# Patient Record
Sex: MALE | Race: WHITE | NOT HISPANIC OR LATINO | Employment: OTHER | URBAN - METROPOLITAN AREA
[De-identification: names, ages, dates, MRNs, and addresses within clinical notes are randomized per-mention and may not be internally consistent; named-entity substitution may affect disease eponyms.]

---

## 2018-02-20 DIAGNOSIS — D49.2 THORACIC SPINE TUMOR: Primary | ICD-10-CM

## 2018-03-12 ENCOUNTER — HOSPITAL ENCOUNTER (OUTPATIENT)
Dept: MRI IMAGING | Facility: CLINIC | Age: 43
Discharge: HOME | End: 2018-03-12
Attending: NEUROLOGICAL SURGERY | Admitting: NEUROLOGICAL SURGERY
Payer: COMMERCIAL

## 2018-03-12 ENCOUNTER — OFFICE VISIT (OUTPATIENT)
Dept: NEUROSURGERY | Facility: CLINIC | Age: 43
End: 2018-03-12
Payer: COMMERCIAL

## 2018-03-12 VITALS
WEIGHT: 237.8 LBS | BODY MASS INDEX: 35.22 KG/M2 | HEART RATE: 94 BPM | DIASTOLIC BLOOD PRESSURE: 82 MMHG | HEIGHT: 69 IN | SYSTOLIC BLOOD PRESSURE: 117 MMHG

## 2018-03-12 DIAGNOSIS — D49.2 THORACIC SPINE TUMOR: Primary | ICD-10-CM

## 2018-03-12 DIAGNOSIS — D49.2 THORACIC SPINE TUMOR: ICD-10-CM

## 2018-03-12 PROCEDURE — 72157 MRI CHEST SPINE W/O & W/DYE: CPT

## 2018-03-12 PROCEDURE — 25000128 H RX IP 250 OP 636: Performed by: NEUROLOGICAL SURGERY

## 2018-03-12 PROCEDURE — A9585 GADOBUTROL INJECTION: HCPCS | Performed by: NEUROLOGICAL SURGERY

## 2018-03-12 RX ORDER — GADOBUTROL 604.72 MG/ML
10 INJECTION INTRAVENOUS ONCE
Status: COMPLETED | OUTPATIENT
Start: 2018-03-12 | End: 2018-03-12

## 2018-03-12 RX ADMIN — GADOBUTROL 10 ML: 604.72 INJECTION INTRAVENOUS at 11:07

## 2018-03-12 ASSESSMENT — PAIN SCALES - GENERAL: PAINLEVEL: NO PAIN (0)

## 2018-03-12 NOTE — LETTER
3/12/2018       RE: Darinel Sanchez  [id number] [facility] [address]     Dear Colleague,    Thank you for referring your patient, Darinel Sanchez, to the [facility] NEUROSURGERY at [facility]. Please see a copy of my visit note below.    Service Date: 2018      Sabi Perales MD      RE:  Darinel Sanchez      Dear Dr. Perales:      I had the pleasure to see Darinel today in my Neurosurgical Clinic for followup evaluation of a spinal tumor.      Briefly, Darinel is a 42-year-old gentleman who had presented to me in  with a spinal cord tumor.  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  He has done well over the last couple of years and returns today for a repeat MRI.        We have reviewed his MRI from today and there is no evidence of recurrence of the tumor.  I have recommended that he get a repeat MRI in the next 2 years.      Overall, I spent approximately 15 minutes with Darinel, with the majority of this time spent in consultation and developing a treatment plan.      Thank you for your trust and the opportunity to participate in Darinel's care.  If you have any further questions or concerns, please feel free to contact me on my cell phone at [phone].         MANOHAR CHE MD             D: 2018   T: 2018   MT: NEELIMA      Name:     DARINEL SANCHEZ   MRN:      [id number]        Account:      [id number]   :      1975           Service Date: 2018      Document: [id number]

## 2018-03-13 NOTE — PROGRESS NOTES
Service Date: 2018      Sabi Perales MD      RE:  Darinel Sanchez      Dear Dr. Perales:      I had the pleasure to see Darinel today in my Neurosurgical Clinic for followup evaluation of a spinal tumor.      Briefly, Darinel is a 42-year-old gentleman who had presented to me in  with a spinal cord tumor.  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  He has done well over the last couple of years and returns today for a repeat MRI.        We have reviewed his MRI from today and there is no evidence of recurrence of the tumor.  I have recommended that he get a repeat MRI in the next 2 years.      Overall, I spent approximately 15 minutes with Darinel, with the majority of this time spent in consultation and developing a treatment plan.      Thank you for your trust and the opportunity to participate in Darinel's care.  If you have any further questions or concerns, please feel free to contact me on my cell phone at [phone].         MANOHAR CHE MD             D: 2018   T: 2018   MT: NEELIMA      Name:     DARINEL SANCHEZ   MRN:      [id number]        Account:      [id number]   :      1975           Service Date: 2018      Document: [id number]

## 2019-02-12 ENCOUNTER — OFFICE VISIT (OUTPATIENT)
Dept: FAMILY MEDICINE | Facility: CLINIC | Age: 44
End: 2019-02-12
Payer: COMMERCIAL

## 2019-02-12 VITALS
DIASTOLIC BLOOD PRESSURE: 81 MMHG | SYSTOLIC BLOOD PRESSURE: 134 MMHG | RESPIRATION RATE: 18 BRPM | BODY MASS INDEX: 35.7 KG/M2 | HEIGHT: 69 IN | OXYGEN SATURATION: 98 % | HEART RATE: 84 BPM | WEIGHT: 241 LBS | TEMPERATURE: 98.3 F

## 2019-02-12 DIAGNOSIS — R42 VERTIGO: Primary | ICD-10-CM

## 2019-02-12 PROCEDURE — 99213 OFFICE O/P EST LOW 20 MIN: CPT | Performed by: PHYSICIAN ASSISTANT

## 2019-02-12 ASSESSMENT — MIFFLIN-ST. JEOR: SCORE: 1973.55

## 2019-02-12 NOTE — PROGRESS NOTES
SUBJECTIVE:                                                    Darinel Alicia is a 43 year old male seen today who  has a past medical history of ADHD (attention deficit hyperactivity disorder) and Spinal cord mass (H).   who presents to clinic today for the following health issues:       ED/UC Followup:    Facility:  [facility]  Date of visit: 2/1/19  Reason for visit: Vertigo, chest pain  Current Status: no improvement per pt - has been 2 weeks. Left ear feels plugged and ringing. Jaw feels sore. Was told to see ENT but couldn't get in until the end of the month      Care Everywhere Reviewed  Chief Complaint:   Dizziness and chest pain     History of Present Illness:   The history is provided by the patient.     Darinel Alicia is a 43 y.o. male who presents to the emergency department for evaluation of dizziness and chest pain. Yesterday the patient woke up and was dizzy, which continued all day, coming in spells when he would stand/sit up with associated blurred vision. Around 2200 he started to have left arm numbness and he woke up at 0230 with mid chest pain, so he presents to the emergency department. On evaluation here, he currently rates his chest pain a 2/10 in severity. He is also complaining of indigestion, but denies any fevers, chills, weakness, nausea, vomiting, urinary symptoms, bowel symptoms, falls, injuries, hallucinations, jaw pain, rash or tinnitus. The patient does note that the back of his head was sore last night, but he does not describe this as a true headache. He does state that he has had cardiac issues in the past with irregular heart beats and feeling like his heart is going to stop and his symptoms currently feel similar to when he was about to feel those cardiac symptoms. The patient has had no recent long travel or surgeries.     Impression and Plan:  Darinel Alicia is a 43 y.o. male who here for evaluation of a constellation of symptoms as above including  left-sided chest pain that is nonexertional and somewhat reproducible and sharp and intermittent in nature, left-sided arm paresthesia without objective sensory deficit, vertiginous symptoms. Workup included evaluation for central nervous disaster vs peripheral vertigo vs ACS vs dissection vs pneumothorax vs pneumonia versus cellulitis versus shingles versus musculoskeletal chest pain. His neurologic exam is reassuring. The HI NTS exam was indeterminate. His vertigo is consistent with labyrinthitis versus BPPV. It is clearly positional. He had positive Sam-Hallpike to the right. The Epley maneuver did not improve his symptoms. He does not have significant nausea or vomiting. He denied any need for medications for nausea. He was given meclizine and 1 L of IV fluid. He continued to have symptoms however they are very brief and tolerable. He was ambulated in the emergency department and has no ataxia. I do not believe this is consistent with posterior stroke. Troponin and delta troponin were reassuring as well as EKG. D-dimer was normal and he is low risk for PE, therefore I believe this is effectively ruled out. The chest pain is very atypical and certainly not exertional. He has low risk for ACS. He was initially worked up for this and given aspirin and nitro. His pain comes and goes and is sharp in nature. Chest x-ray was unremarkable. I do not believe this is consistent with dissection. There is no overlying skin changes suggestive of cellulitis or shingles. I offered outpatient stress test however he declined and advised that he would follow-up with his primary care doctor. We discussed reasons to return he was discharged home with his wife. I advised him not to drive while having vertigo.    Con't dizziness: better with head rotation but increase with flex and ext   Feels like left ear is plugged. Did feel left ear pop yesterday and now ringing since.   No previous problems like this. No recent illnesses. No  "colds, fevers, or chest pain or short of breath.   \"I feel fine otherwise\"     Problem list and histories reviewed & adjusted, as indicated.  Additional history: as documented    Patient Active Problem List   Diagnosis     Family history of ischemic heart disease     Thoracic back pain     Adult ADHD     Former smoker     CARDIOVASCULAR SCREENING; LDL GOAL LESS THAN 130     Spinal cord mass (H)     Thoracic spine tumor     Past Surgical History:   Procedure Laterality Date     CHOLECYSTECTOMY       EYE SURGERY      Multiple     GALLBLADDER SURGERY      Removal     LAMINECTOMY, EXCISE TUMOR THORACIC, COMBINED  5/16/2014    Procedure: COMBINED LAMINECTOMY, EXCISE TUMOR THORACIC;  Surgeon: Ananda Dinero MD;  Location: [facility] OR     Testicular surgery  As a child       Social History     Tobacco Use     Smoking status: Light Tobacco Smoker     Packs/day: 0.25     Types: Cigarettes     Smokeless tobacco: Former User     Types: Chew     Tobacco comment: 3 cigarettes and 1 chew   Substance Use Topics     Alcohol use: Yes     Comment: Occasional - 1 drink or less per week     Family History   Problem Relation Age of Onset     Allergies Mother      Arthritis Mother      Depression Mother      Gastrointestinal Disease Mother      Obesity Mother      C.A.D. Mother      Arthritis Father      Endocrine Disease Father      Heart Disease Father      C.A.D. Father      Cancer Maternal Grandmother      Endocrine Disease Maternal Grandmother      C.A.D. Maternal Grandmother      Cancer Maternal Grandfather      Endocrine Disease Maternal Grandfather      C.A.D. Maternal Grandfather      Cerebrovascular Disease Paternal Grandmother      Endocrine Disease Paternal Grandmother      Heart Disease Paternal Grandmother      Obesity Paternal Grandmother      C.A.D. Paternal Grandmother      Cerebrovascular Disease Paternal Grandfather      Cancer Paternal Grandfather      Cardiovascular Paternal Grandfather      Endocrine Disease " "Paternal Grandfather      Heart Disease Paternal Grandfather      Obesity Paternal Grandfather      C.A.DEBORAH. Paternal Grandfather      CHELSY.A.DEBORAH. Brother      JUN. Sister      CHELSY.YANN.DEBORAH. Brother          Current Outpatient Medications   Medication Sig Dispense Refill     amphetamine-dextroamphetamine (ADDERALL) 10 MG tablet Take 10 mg by mouth daily.       Sertraline HCl (ZOLOFT PO) Take 25 mg by mouth daily Per patient report         Acetaminophen (TYLENOL PO) Take by mouth every 4 hours as needed for mild pain or fever       Ibuprofen (ADVIL PO) Take 400 mg by mouth every 6 hours as needed.         Allergies   Allergen Reactions     Penicillin G      From childhood, unknown reaction     BP Readings from Last 3 Encounters:   02/12/19 134/81   03/12/18 117/82   05/12/15 130/79    Wt Readings from Last 3 Encounters:   02/12/19 109.3 kg (241 lb)   03/12/18 107.9 kg (237 lb 12.8 oz)   05/12/15 98 kg (216 lb 1.6 oz)            Labs reviewed in EPIC    ROS:  Constitutional, HEENT, cardiovascular, pulmonary, gi and gu systems are negative, except as otherwise noted.    OBJECTIVE:     /81   Pulse 84   Temp 98.3  F (36.8  C) (Oral)   Resp 18   Ht 1.753 m (5' 9\")   Wt 109.3 kg (241 lb)   SpO2 98%   BMI 35.59 kg/m    Body mass index is 35.59 kg/m .  GENERAL: healthy, alert and no distress  EYES: Eyes grossly normal to inspection, PERRL and conjunctivae and sclerae normal  HENT: ear canals and TM's normal, nose and mouth without ulcers or lesions  NECK: no adenopathy, no asymmetry, masses, or scars and thyroid normal to palpation  RESP: lungs clear to auscultation - no rales, rhonchi or wheezes  CV: regular rate and rhythm, normal S1 S2, no S3 or S4, no murmur, click or rub, no peripheral edema and peripheral pulses strong  MS: no gross musculoskeletal defects noted, no edema  SKIN: no suspicious lesions or rashes  NEURO: Normal strength and tone, sensory exam grossly normal, mentation intact and cranial nerves 2-12 " intact  PSYCH: mentation appears normal, affect normal/bright    Diagnostic Test Results:  none     ASSESSMENT/PLAN:         ICD-10-CM    1. Vertigo R42 OTOLARYNGOLOGY REFERRAL     [facility] PT, HAND, AND CHIROPRACTIC REFERRAL   patient requesting referral to ENT. He is very concerned. History of spinal tumor   and wants to make sure his symptoms aren't related.   Consider PHYSICAL THERAPY for further assessment. States his brother in law is a PHYSICAL THERAPIST and he may see him.   Discussed tx of vertigo. Slow head movements. warning signs discussed. Ok for meclizine.   Discussed with Dr. Didi Mojica PA-C  [facility]

## 2019-02-27 ENCOUNTER — OFFICE VISIT (OUTPATIENT)
Dept: AUDIOLOGY | Facility: CLINIC | Age: 44
End: 2019-02-27
Payer: COMMERCIAL

## 2019-02-27 ENCOUNTER — OFFICE VISIT (OUTPATIENT)
Dept: OTOLARYNGOLOGY | Facility: CLINIC | Age: 44
End: 2019-02-27
Payer: COMMERCIAL

## 2019-02-27 VITALS
BODY MASS INDEX: 35.7 KG/M2 | TEMPERATURE: 98 F | SYSTOLIC BLOOD PRESSURE: 129 MMHG | HEART RATE: 78 BPM | WEIGHT: 241 LBS | HEIGHT: 69 IN | DIASTOLIC BLOOD PRESSURE: 88 MMHG | OXYGEN SATURATION: 98 % | RESPIRATION RATE: 18 BRPM

## 2019-02-27 DIAGNOSIS — R42 VERTIGO: Primary | ICD-10-CM

## 2019-02-27 DIAGNOSIS — H81.11 BENIGN PAROXYSMAL POSITIONAL VERTIGO, RIGHT: ICD-10-CM

## 2019-02-27 DIAGNOSIS — R42 DIZZINESS AND GIDDINESS: Primary | ICD-10-CM

## 2019-02-27 DIAGNOSIS — H90.5 HIGH FREQUENCY SENSORINEURAL HEARING LOSS OF LEFT EAR: ICD-10-CM

## 2019-02-27 PROCEDURE — 99207 ZZC NO CHARGE LOS: CPT | Performed by: AUDIOLOGIST

## 2019-02-27 PROCEDURE — 99203 OFFICE O/P NEW LOW 30 MIN: CPT | Performed by: OTOLARYNGOLOGY

## 2019-02-27 PROCEDURE — 92550 TYMPANOMETRY & REFLEX THRESH: CPT | Performed by: AUDIOLOGIST

## 2019-02-27 PROCEDURE — 92557 COMPREHENSIVE HEARING TEST: CPT | Performed by: AUDIOLOGIST

## 2019-02-27 ASSESSMENT — MIFFLIN-ST. JEOR: SCORE: 1973.55

## 2019-02-27 NOTE — PROGRESS NOTES
AUDIOLOGY REPORT    SUBJECTIVE:  Darinel Alicia is a 43 year old male who was seen in the Audiology Clinic at [facility] for an audiologic evaluation, referred by Dr. Campos.  No previous audiograms are available at today's appointment.  The patient reports a 6 week history of vertigo and feeling lightheaded. He feels he is hearing well and notes no noted changes in his heraing. The patient denies bilateral otalgia, bilateral drainage and history of noise exposure.     OBJECTIVE:  Otoscopic exam indicates ears are clear of cerumen bilaterally     Pure Tone Thresholds assessed using conventional audiometry with good  reliability from 250-8000 Hz bilaterally using circumaural headphones     RIGHT:  normal hearing thresholds    LEFT:    normal and mild sensorineural hearing loss    Tympanogram:    RIGHT: normal eardrum mobility ,1000 Hz,2000 ipsi/contra reflexes present ,contra absent                  at 500 Hz    LEFT:   normal eardrum mobility,1000 -2000 Hz ipsi/contra reflexes present , contra                absent at 500 Hz    Speech Reception Threshold:    RIGHT: 5 dB HL    LEFT:   5 dB HL  Word Recognition Score:     RIGHT: 92% at 50 dB HL using W22 recorded word list.    LEFT:   96% at 50 dB HL using W22 recorded word list.      ASSESSMENT:   Normal hearing in the right ear with normal to mild v-shaped sensorineural hearing loss in the left ear.     Today s results were discussed with the patient in detail.     PLAN: It is recommended that the patient be seen by Dr. Campos for medical evaluation of their ears and hearing evaluation. Patient was counseled regarding hearing loss and impact on communication.  Reviewed need for ear protection when exposed to occupational and recreational noise.  Please call this clinic with questions regarding these results or recommendations.        Evelin Otoole M.A. GILBERT-AAA  Clinical audiologist [place] # [id number]  2/27/2019

## 2019-02-27 NOTE — NURSING NOTE
"Chief Complaint   Patient presents with     Consult For     vertigo and tinnitus       Initial /88 (BP Location: Right arm, Patient Position: Chair, Cuff Size: Adult Large)   Pulse 78   Temp 98  F (36.7  C) (Oral)   Resp 18   Ht 1.753 m (5' 9\")   Wt 109.3 kg (241 lb)   SpO2 98%   BMI 35.59 kg/m   Estimated body mass index is 35.59 kg/m  as calculated from the following:    Height as of this encounter: 1.753 m (5' 9\").    Weight as of this encounter: 109.3 kg (241 lb).  BP completed using cuff size: large  Medications and allergies reviewed.      Diya TORRES MA    "

## 2019-02-27 NOTE — LETTER
2/27/2019         RE: Darinel Alicia  [address] [facility] [address]        Dear Colleague,    Thank you for referring your patient, Darinel Alicia, to the [facility]. Please see a copy of my visit note below.        History of Present Illness - Darinel Alicia is a 43 year old male who presents with a 1 month history of recurrent dizziness. He first noticed the problem when he got out of bed and immediately felt dizzy and fell. He was able to rest and the condition improved, but he still felt lightheaded. This then recurred when he stood up after lying on his left side for several hours. He denies any associated hearing changes. He has no prior difficulty with vertigo. He is able to drive himself now.    Past Medical History -   Patient Active Problem List   Diagnosis     Family history of ischemic heart disease     Thoracic back pain     Adult ADHD     Former smoker     CARDIOVASCULAR SCREENING; LDL GOAL LESS THAN 130     Spinal cord mass (H)     Thoracic spine tumor       Current Medications -   Current Outpatient Medications:      Acetaminophen (TYLENOL PO), Take by mouth every 4 hours as needed for mild pain or fever, Disp: , Rfl:      amphetamine-dextroamphetamine (ADDERALL) 10 MG tablet, Take 10 mg by mouth daily., Disp: , Rfl:      Ibuprofen (ADVIL PO), Take 400 mg by mouth every 6 hours as needed.  , Disp: , Rfl:      Sertraline HCl (ZOLOFT PO), Take 25 mg by mouth daily Per patient report , Disp: , Rfl:     Allergies -   Allergies   Allergen Reactions     Penicillin G      From childhood, unknown reaction       Social History -   Social History     Socioeconomic History     Marital status:      Spouse name: Sruthi Alicia     Number of children: 2     Years of education: None     Highest education level: None   Occupational History     None   Social Needs     Financial resource strain: None     Food insecurity:     Worry: None     Inability: None      Transportation needs:     Medical: None     Non-medical: None   Tobacco Use     Smoking status: Light Tobacco Smoker     Packs/day: 0.25     Smokeless tobacco: Former User     Types: Chew     Tobacco comment: Vaping   Substance and Sexual Activity     Alcohol use: Yes     Comment: Occasional - 1 drink or less per week     Drug use: No     Sexual activity: Yes     Partners: Female   Lifestyle     Physical activity:     Days per week: None     Minutes per session: None     Stress: None   Relationships     Social connections:     Talks on phone: None     Gets together: None     Attends [employer] service: None     Active member of club or organization: None     Attends meetings of clubs or organizations: None     Relationship status: None     Intimate partner violence:     Fear of current or ex partner: None     Emotionally abused: None     Physically abused: None     Forced sexual activity: None   Other Topics Concern     Parent/sibling w/ CABG, MI or angioplasty before 65F 55M? Yes   Social History Narrative     None       Family History -   Family History   Problem Relation Age of Onset     Allergies Mother      Arthritis Mother      Depression Mother      Gastrointestinal Disease Mother      Obesity Mother      C.A.D. Mother      Arthritis Father      Endocrine Disease Father      Heart Disease Father      C.A.D. Father      Cancer Maternal Grandmother      Endocrine Disease Maternal Grandmother      C.A.D. Maternal Grandmother      Cancer Maternal Grandfather      Endocrine Disease Maternal Grandfather      C.A.D. Maternal Grandfather      Cerebrovascular Disease Paternal Grandmother      Endocrine Disease Paternal Grandmother      Heart Disease Paternal Grandmother      Obesity Paternal Grandmother      C.A.D. Paternal Grandmother      Cerebrovascular Disease Paternal Grandfather      Cancer Paternal Grandfather      Cardiovascular Paternal Grandfather      Endocrine Disease Paternal Grandfather      Heart Disease  "Paternal Grandfather      Obesity Paternal Grandfather      C.A.D. Paternal Grandfather      C.A.D. Brother      C.A.D. Sister      C.A.D. Brother        Review of Systems - As per HPI and PMHx, otherwise 7 system review of the head and neck negative. 10+ system review negative.    Physical Exam  /88 (BP Location: Right arm, Patient Position: Chair, Cuff Size: Adult Large)   Pulse 78   Temp 98  F (36.7  C) (Oral)   Resp 18   Ht 1.753 m (5' 9\")   Wt 109.3 kg (241 lb)   SpO2 98%   BMI 35.59 kg/m     General - The patient is well nourished and well developed, and appears to have good nutritional status.  Alert and oriented to person and place, answers questions and cooperates with examination appropriately.   Head and Face - Normocephalic and atraumatic, with no gross asymmetry noted of the contour of the facial features.  The facial nerve is intact, with strong symmetric movements.  Voice and Breathing - The patient was breathing comfortably without the use of accessory muscles. There was no wheezing, stridor, or stertor.  The patients voice was clear and strong, and had appropriate pitch and quality.  Ears - Bilateral pinna and EACs with normal appearing overlying skin. Tympanic membrane intact with good mobility on pneumatic otoscopy bilaterally. Bony landmarks of the ossicular chain are normal. The tympanic membranes are normal in appearance. No retraction, perforation, or masses.  No fluid or purulence was seen in the external canal or the middle ear.   Eyes - Extraocular movements intact.  Sclera were not icteric or injected, conjunctiva were pink and moist.  Mouth - Examination of the oral cavity showed pink, healthy oral mucosa. No lesions or ulcerations noted.  The tongue was mobile and midline, and the dentition were in good condition.    Throat - The walls of the oropharynx were smooth, pink, moist, symmetric, and had no lesions or ulcerations.  The tonsillar pillars and soft palate were " symmetric.  The uvula was midline on elevation.  Neck - Normal midline excursion of the laryngotracheal complex during swallowing.  Full range of motion on passive movement.  Palpation of the occipital, submental, submandibular, internal jugular chain, and supraclavicular nodes did not demonstrate any abnormal lymph nodes or masses.  The carotid pulse was palpable bilaterally.  Palpation of the thyroid was soft and smooth, with no nodules or goiter appreciated.  The trachea was mobile and midline.  Nose - External contour is symmetric, no gross deflection or scars.  Nasal mucosa is pink and moist with no abnormal mucus.  The septum was midline and non-obstructive, turbinates of normal size and position.  No polyps, masses, or purulence noted on examination.      Procedure:  1. Sam Hallpike Test  2. Epley Maneuver    Indication:  Vertigo, suspect BPPV    Technique: The patient was seated on the exam table with Frenzel lenses. The patient's head was first turned to the left and the patient was reclined such that the head was hung off of the top of the table. The eyes were observed for 20 seconds in this position and no rotational nystagmus was identified. The patient reported no vertigo.  The patient was again seated upright and the head turned to the right side before reclining in the same fashion. The eyes were observed for 20 seconds in this position and Positive rotational nystagmus was identified. The patient reported vertigo.        Assessment - Darinel Alicia is a 43 year old male with right posterior canal BPPV. I placed referral to physical therapy for Epley, and cautioned him that he may be off balance for a few days after the procedure. 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 I do not think this is contributing to his vertigo disorder. Return if not improved after Epley maneuvers.       Dr. Feli Campos MD  Otolaryngology  [facility]  [facility]    Again, thank you for allowing me to participate in the care of your patient.        Sincerely,        Feli Campos MD

## 2019-02-27 NOTE — PROGRESS NOTES
History of Present Illness - Darinel Alicia is a 43 year old male who presents with a 1 month history of recurrent dizziness. He first noticed the problem when he got out of bed and immediately felt dizzy and fell. He was able to rest and the condition improved, but he still felt lightheaded. This then recurred when he stood up after lying on his left side for several hours. He denies any associated hearing changes. He has no prior difficulty with vertigo. He is able to drive himself now.    Past Medical History -   Patient Active Problem List   Diagnosis     Family history of ischemic heart disease     Thoracic back pain     Adult ADHD     Former smoker     CARDIOVASCULAR SCREENING; LDL GOAL LESS THAN 130     Spinal cord mass (H)     Thoracic spine tumor       Current Medications -   Current Outpatient Medications:      Acetaminophen (TYLENOL PO), Take by mouth every 4 hours as needed for mild pain or fever, Disp: , Rfl:      amphetamine-dextroamphetamine (ADDERALL) 10 MG tablet, Take 10 mg by mouth daily., Disp: , Rfl:      Ibuprofen (ADVIL PO), Take 400 mg by mouth every 6 hours as needed.  , Disp: , Rfl:      Sertraline HCl (ZOLOFT PO), Take 25 mg by mouth daily Per patient report , Disp: , Rfl:     Allergies -   Allergies   Allergen Reactions     Penicillin G      From childhood, unknown reaction       Social History -   Social History     Socioeconomic History     Marital status:      Spouse name: Sruthi Alicia     Number of children: 2     Years of education: None     Highest education level: None   Occupational History     None   Social Needs     Financial resource strain: None     Food insecurity:     Worry: None     Inability: None     Transportation needs:     Medical: None     Non-medical: None   Tobacco Use     Smoking status: Light Tobacco Smoker     Packs/day: 0.25     Smokeless tobacco: Former User     Types: Chew     Tobacco comment: Vaping   Substance and Sexual Activity     Alcohol  use: Yes     Comment: Occasional - 1 drink or less per week     Drug use: No     Sexual activity: Yes     Partners: Female   Lifestyle     Physical activity:     Days per week: None     Minutes per session: None     Stress: None   Relationships     Social connections:     Talks on phone: None     Gets together: None     Attends Yarsanism service: None     Active member of club or organization: None     Attends meetings of clubs or organizations: None     Relationship status: None     Intimate partner violence:     Fear of current or ex partner: None     Emotionally abused: None     Physically abused: None     Forced sexual activity: None   Other Topics Concern     Parent/sibling w/ CABG, MI or angioplasty before 65F 55M? Yes   Social History Narrative     None       Family History -   Family History   Problem Relation Age of Onset     Allergies Mother      Arthritis Mother      Depression Mother      Gastrointestinal Disease Mother      Obesity Mother      C.A.D. Mother      Arthritis Father      Endocrine Disease Father      Heart Disease Father      C.A.D. Father      Cancer Maternal Grandmother      Endocrine Disease Maternal Grandmother      C.A.D. Maternal Grandmother      Cancer Maternal Grandfather      Endocrine Disease Maternal Grandfather      C.A.D. Maternal Grandfather      Cerebrovascular Disease Paternal Grandmother      Endocrine Disease Paternal Grandmother      Heart Disease Paternal Grandmother      Obesity Paternal Grandmother      C.A.D. Paternal Grandmother      Cerebrovascular Disease Paternal Grandfather      Cancer Paternal Grandfather      Cardiovascular Paternal Grandfather      Endocrine Disease Paternal Grandfather      Heart Disease Paternal Grandfather      Obesity Paternal Grandfather      C.A.D. Paternal Grandfather      C.A.D. Brother      C.A.D. Sister      C.A.D. Brother        Review of Systems - As per HPI and PMHx, otherwise 7 system review of the head and neck negative. 10+  "system review negative.    Physical Exam  /88 (BP Location: Right arm, Patient Position: Chair, Cuff Size: Adult Large)   Pulse 78   Temp 98  F (36.7  C) (Oral)   Resp 18   Ht 1.753 m (5' 9\")   Wt 109.3 kg (241 lb)   SpO2 98%   BMI 35.59 kg/m    General - The patient is well nourished and well developed, and appears to have good nutritional status.  Alert and oriented to person and place, answers questions and cooperates with examination appropriately.   Head and Face - Normocephalic and atraumatic, with no gross asymmetry noted of the contour of the facial features.  The facial nerve is intact, with strong symmetric movements.  Voice and Breathing - The patient was breathing comfortably without the use of accessory muscles. There was no wheezing, stridor, or stertor.  The patients voice was clear and strong, and had appropriate pitch and quality.  Ears - Bilateral pinna and EACs with normal appearing overlying skin. Tympanic membrane intact with good mobility on pneumatic otoscopy bilaterally. Bony landmarks of the ossicular chain are normal. The tympanic membranes are normal in appearance. No retraction, perforation, or masses.  No fluid or purulence was seen in the external canal or the middle ear.   Eyes - Extraocular movements intact.  Sclera were not icteric or injected, conjunctiva were pink and moist.  Mouth - Examination of the oral cavity showed pink, healthy oral mucosa. No lesions or ulcerations noted.  The tongue was mobile and midline, and the dentition were in good condition.    Throat - The walls of the oropharynx were smooth, pink, moist, symmetric, and had no lesions or ulcerations.  The tonsillar pillars and soft palate were symmetric.  The uvula was midline on elevation.  Neck - Normal midline excursion of the laryngotracheal complex during swallowing.  Full range of motion on passive movement.  Palpation of the occipital, submental, submandibular, internal jugular chain, and " supraclavicular nodes did not demonstrate any abnormal lymph nodes or masses.  The carotid pulse was palpable bilaterally.  Palpation of the thyroid was soft and smooth, with no nodules or goiter appreciated.  The trachea was mobile and midline.  Nose - External contour is symmetric, no gross deflection or scars.  Nasal mucosa is pink and moist with no abnormal mucus.  The septum was midline and non-obstructive, turbinates of normal size and position.  No polyps, masses, or purulence noted on examination.      Procedure:  1. [place] Hallpike Test  2. Epley Maneuver    Indication:  Vertigo, suspect BPPV    Technique: The patient was seated on the exam table with Frenzel lenses. The patient's head was first turned to the left and the patient was reclined such that the head was hung off of the top of the table. The eyes were observed for 20 seconds in this position and no rotational nystagmus was identified. The patient reported no vertigo.  The patient was again seated upright and the head turned to the right side before reclining in the same fashion. The eyes were observed for 20 seconds in this position and Positive rotational nystagmus was identified. The patient reported vertigo.        Assessment - Darinel Alicia is a 43 year old male with right posterior canal BPPV. I placed referral to physical therapy for Epley, and cautioned him that he may be off balance for a few days after the procedure. 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 I do not think this is contributing to his vertigo disorder. Return if not improved after Epley maneuvers.       Dr. Feli Campos MD  Otolaryngology  [facility]

## 2019-02-27 NOTE — PATIENT INSTRUCTIONS
Per physician instructions  If you have questions or concerns on any instructions given to you by your provider today or if you need to schedule an appointment, you can reach us at [phone].

## 2022-05-04 ENCOUNTER — OFFICE VISIT (OUTPATIENT)
Dept: PODIATRY | Facility: CLINIC | Age: 47
End: 2022-05-04

## 2022-05-04 ENCOUNTER — ANCILLARY PROCEDURE (OUTPATIENT)
Dept: GENERAL RADIOLOGY | Facility: CLINIC | Age: 47
End: 2022-05-04
Attending: PODIATRIST
Payer: COMMERCIAL

## 2022-05-04 VITALS
WEIGHT: 241 LBS | BODY MASS INDEX: 35.7 KG/M2 | DIASTOLIC BLOOD PRESSURE: 90 MMHG | SYSTOLIC BLOOD PRESSURE: 120 MMHG | HEART RATE: 84 BPM | HEIGHT: 69 IN

## 2022-05-04 DIAGNOSIS — S92.811G CLOSED FRACTURE OF SESAMOID BONE OF RIGHT FOOT WITH DELAYED HEALING, SUBSEQUENT ENCOUNTER: Primary | ICD-10-CM

## 2022-05-04 PROCEDURE — 73630 X-RAY EXAM OF FOOT: CPT | Mod: TC | Performed by: RADIOLOGY

## 2022-05-04 PROCEDURE — 99203 OFFICE O/P NEW LOW 30 MIN: CPT | Performed by: PODIATRIST

## 2022-05-04 ASSESSMENT — PAIN SCALES - GENERAL: PAINLEVEL: MILD PAIN (3)

## 2022-05-04 NOTE — PROGRESS NOTES
"PATIENT HISTORY:  Darinel Alicia is a 46 year old male who presents to clinic as a self referral with a chief complaint of great right toe pain including numbness.  The patient is seen by themselves.  The patient relates the pain is primarily located around the great toe.  Reports insidious onset without acute precipitating event. that has been going on for 2 month(s).  The patient has previously tried rest and ice with little relief.  Denies any prior history of similar issues..  The patient is currently employed as realtor.  Any previous notes and studies that pertain to the patient's condition were reviewed.      Past Medical History:   Past Medical History:   Diagnosis Date     ADHD (attention deficit hyperactivity disorder)      Spinal cord mass (H)        Medications:   Current Outpatient Medications:      amphetamine-dextroamphetamine (ADDERALL) 10 MG tablet, Take 10 mg by mouth daily., Disp: , Rfl:      Sertraline HCl (ZOLOFT PO), Take 25 mg by mouth daily Per patient report , Disp: , Rfl:      Allergies:    Allergies   Allergen Reactions     Penicillin G      From childhood, unknown reaction       Vitals: BP (!) 120/90   Pulse 84   Ht 1.753 m (5' 9\")   Wt 109.3 kg (241 lb)   BMI 35.59 kg/m    BMI= Body mass index is 35.59 kg/m .    LOWER EXTREMITY PHYSICAL EXAM    Dermatologic: Skin is intact to right lower extremity without significant lesions, rash or abrasion.        Vascular: DP & PT pulses are intact & regular on the right.   CFT and skin temperature is normal to the right lower extremity.     Neurologic: Lower extremity sensation is intact to light touch.  No evidence of weakness in the right lower extremity.        Musculoskeletal: Patient is ambulatory without assistive device or brace.  No gross ankle deformity noted.  No foot or ankle joint effusion is noted.  Noted pain on palpation of both the lateral sesamoid on the right foot.    Diagnostics:  Radiographs included three views of the " right foot demonstrating multipartite lateral sesamoid due to possible fracture with delayed healing.  No cortical erosions or periosteal elevation.  All joint margins appear stable.  There is no apparent fracture or tumor formation noted.  There is no evidence of foreign body.  The images were independently reviewed by myself along with the patient explaining the findings.      ASSESSMENT / PLAN:     ICD-10-CM    1. Closed fracture of sesamoid bone of right foot with delayed healing, subsequent encounter  S92.811G        I have explained to Darinel about the conditions.  We discussed the underlying contributing factors to the condition as well as treatment options along with expected length of recovery.  At this time, the patient was educated on the importance of offloading supportive shoes and other devices.  I demonstrated to the patient calf stretches to perform every hour daily until symptoms resolve.  After symptoms resolve, the patient was advised to perform the stretches 3 times daily to prevent future recurrence.  The patient was instructed to perform warm soaks with Epson salt after which to also apply over-the-counter Voltaren gel to deeply massage the injured tissue.  The patient was instructed to do this on a daily basis until symptoms resolve.   The patient was recommended to wear a Dynaflex insert that will aid in offloading the tension forces to the soft tissues and prevent further inflammation.  The patient declined at this point.  The patient may return for reevaluation and to determine if any further treatment will be needed if problems persist.      Darinel verbalized agreement with and understanding of the rational for the diagnosis and treatment plan.  All questions were answered to best of my ability and the patient's satisfaction. The patient was advised to contact the clinic with any questions that may arise after the clinic visit.      Disclaimer: This note consists of symbols  derived from keyboarding, dictation and/or voice recognition software. As a result, there may be errors in the script that have gone undetected. Please consider this when interpreting information found in this chart.       BECKY Fu D.P.M., MARIO.F.A.S.

## 2022-05-04 NOTE — NURSING NOTE
"Chief Complaint   Patient presents with     Right Great Toe - Pain       Initial BP (!) 120/90   Pulse 84   Ht 1.753 m (5' 9\")   Wt 109.3 kg (241 lb)   BMI 35.59 kg/m   Estimated body mass index is 35.59 kg/m  as calculated from the following:    Height as of this encounter: 1.753 m (5' 9\").    Weight as of this encounter: 109.3 kg (241 lb).  Medications and allergies reviewed.      Ayesha CABRERA MA    "

## 2022-05-04 NOTE — PATIENT INSTRUCTIONS
Next Steps:      Support:  Wear supportive shoes, sandals, boots and/or inserts that have a rigid supportive sole.    This will offload the majority of tension forces that travel through your feet every step you take.    [employer] Max Cushioning Elite/Premier   [employer] Relax Fit D'Lux Walker  [employer]eb[employer] Walk Ultra 7 DMX MAX   Epi Bondi walking shoes  Superfeet inserts ([email or web address])  It is important that you also wear supportive shoe wear in the house to continue providing support to your feet.    You may always use a cushioned liner for your shoes if that makes your feet feel better.  Stretching  Calf stretching is essential to offload the tension forces that travel through your feet every step you take  Preferred calf stretch is the Runner's Stretch  Place one foot behind the other foot, flat against the ground (it is important to keep the heel on the ground).  The back leg is the one that will be stretched.  Start with the knee straight and lean your hips into the wall, counter or whatever you are leaning into - count to ten.  Next, bend the knee.  You should feel the stretch lower in the calf muscle - count to ten.  Repeat this stretch once an hour to start off with.  When symptoms subside, I recommend performing the stretch 3 times daily to prevent any future problems.                Tissue Massage  It is important that you physically loosen the inflammation tissue to help your body heal the injured tissue.  I recommend soaking your foot in warm water to increase the microcirculation to the soft tissues.  You may add Epson salt to the water if you prefer.  You may apply an over-the-counter muscle rub, such as Voltaren gel, and deeply massage the injured tissue.  Reduce Inflammation  You can ice the injured tissue with an ice pack with a light cloth covering or soaking in ice water 20 minutes to reduce any acute inflammation, typically at the end of the day.      It is important to understand that most  problems that develop in the foot and ankle are caused by excessive tension that cause microinjury to the soft tissues and inflammation in the foot and ankle.  By addressing the underlying causes with support and stretching as well as treating the current inflammatory conditions with tissue massage and anti-inflammatory treatments, most foot and ankle musculoskeletal conditions will resolve.  This may take time to heal.  However, if symptoms persist past 4 weeks you should return to the office for reevaluation to determine further treatment options.   SMOKING CESSATION  What's in cigarette smoke? - Cigarette smoke contains over 4,000 chemicals. Nicotine is one of the main ingredients which is an insecticide/herbicide. It is poisonous to our nervous system, increases blood clotting risk, and decreases the body's defenses to fight off infection. Another chemical is Carbon Monoxide is an asphyxiating gas that permanently binds to blood cells and blocks the transport of oxygen. This leads to tissue death and decreases your metabolism. Tar is a chemical that coats your lungs and trachea which impairs new oxygen coming in and carbon dioxide getting out of your body.   How does smoking impact surgery? - Smoking is particularly hazardous with regards to surgery. Surgery puts stress on the body and a smoker's body is already under strain from these chemicals. Putting the two together, especially for an elective surgery, could be a recipe for disaster. Smoking before and after surgery increases your risk of heart problems, slow wound healing, delayed bone healing, blood clots, wound infection and anesthesia complications.   What are the benefits of quitting? - In 20 minutes your blood pressure will drop back down to normal. In 8 hours the carbon monoxide (a toxic gas) levels in your blood stream will drop by half, and oxygen levels will return to normal. In 48 hours your chance of having a heart attack will have decreased.  All nicotine will have left your body. Your sense of taste and smell will return to a normal level. In 72 hours your bronchial tubes will relax, and your energy levels will increase. In 2 weeks your circulation will increase, and it will continue to improve for the next 10 weeks.    Recommendations for elective surgery - Ideally, patients should quit smoking 8 weeks before and at least 2 weeks after elective surgery in order to avoid complications. Simply cutting back on the amount of cigarettes smoked per day does not offer any benefit or decrease the risk of poor wound healing, heart problems, and infection. Smokers should also start taking Vitamin C and B for two weeks before surgery and two weeks after surgery.    Ways to Stop Smokin. Nicotine patches, lozenges, or gum  2. Support Groups  3. Medications (see below)    List of Medications:  1. Varenicline Tartrate (CHANTIX) (may be discontinued by FDA as of 2021)  2. Bupropion HCL (WELLBUTRIN, ZYBAN) - note: make sure Wellbutrin is for smoking cessation and not other issues   3. Nicotine Patch (NICODERM)   4. Nicotine Inhaler (NICOTROL)   5. Nicotine Gum Nicotine Polacrilex   6. Nicotine Lozenge: Nicotine Polacrilex (COMMIT)   * [place] offers a smoking support group as well!  Please visit: [email or web address]  If you are interested in these, ask about getting a prescription or talk to your primary care doctor about what may be the best way for you to quit.

## 2022-05-04 NOTE — LETTER
"    5/4/2022         RE: Darinel Alicia  [id number] [facility] [id number]        Dear Colleague,    Thank you for referring your patient, Darinel Alicia, to the [facility] [place]. Please see a copy of my visit note below.    PATIENT HISTORY:  Darinel Alicia is a 46 year old male who presents to clinic as a self referral with a chief complaint of great right toe pain including numbness.  The patient is seen by themselves.  The patient relates the pain is primarily located around the great toe.  Reports insidious onset without acute precipitating event. that has been going on for 2 month(s).  The patient has previously tried rest and ice with little relief.  Denies any prior history of similar issues..  The patient is currently employed as realtor.  Any previous notes and studies that pertain to the patient's condition were reviewed.      Past Medical History:   Past Medical History:   Diagnosis Date     ADHD (attention deficit hyperactivity disorder)      Spinal cord mass (H)        Medications:   Current Outpatient Medications:      amphetamine-dextroamphetamine (ADDERALL) 10 MG tablet, Take 10 mg by mouth daily., Disp: , Rfl:      Sertraline HCl (ZOLOFT PO), Take 25 mg by mouth daily Per patient report , Disp: , Rfl:      Allergies:    Allergies   Allergen Reactions     Penicillin G      From childhood, unknown reaction       Vitals: BP (!) 120/90   Pulse 84   Ht 1.753 m (5' 9\")   Wt 109.3 kg (241 lb)   BMI 35.59 kg/m    BMI= Body mass index is 35.59 kg/m .    LOWER EXTREMITY PHYSICAL EXAM    Dermatologic: Skin is intact to right lower extremity without significant lesions, rash or abrasion.        Vascular: DP & PT pulses are intact & regular on the right.   CFT and skin temperature is normal to the right lower extremity.     Neurologic: Lower extremity sensation is intact to light touch.  No evidence of weakness in the right lower extremity.      "   Musculoskeletal: Patient is ambulatory without assistive device or brace.  No gross ankle deformity noted.  No foot or ankle joint effusion is noted.  Noted pain on palpation of both the lateral sesamoid on the right foot.    Diagnostics:  Radiographs included three views of the right foot demonstrating multipartite lateral sesamoid due to possible fracture with delayed healing.  No cortical erosions or periosteal elevation.  All joint margins appear stable.  There is no apparent fracture or tumor formation noted.  There is no evidence of foreign body.  The images were independently reviewed by myself along with the patient explaining the findings.      ASSESSMENT / PLAN:     ICD-10-CM    1. Closed fracture of sesamoid bone of right foot with delayed healing, subsequent encounter  S92.811G        I have explained to Darinel about the conditions.  We discussed the underlying contributing factors to the condition as well as treatment options along with expected length of recovery.  At this time, the patient was educated on the importance of offloading supportive shoes and other devices.  I demonstrated to the patient calf stretches to perform every hour daily until symptoms resolve.  After symptoms resolve, the patient was advised to perform the stretches 3 times daily to prevent future recurrence.  The patient was instructed to perform warm soaks with Epson salt after which to also apply over-the-counter Voltaren gel to deeply massage the injured tissue.  The patient was instructed to do this on a daily basis until symptoms resolve.   The patient was recommended to wear a Dynaflex insert that will aid in offloading the tension forces to the soft tissues and prevent further inflammation.  The patient declined at this point.  The patient may return for reevaluation and to determine if any further treatment will be needed if problems persist.      Darinel verbalized agreement with and understanding of the  rational for the diagnosis and treatment plan.  All questions were answered to best of my ability and the patient's satisfaction. The patient was advised to contact the clinic with any questions that may arise after the clinic visit.      Disclaimer: This note consists of symbols derived from keyboarding, dictation and/or voice recognition software. As a result, there may be errors in the script that have gone undetected. Please consider this when interpreting information found in this chart.       BECKY Fu D.P.M., F.A.C.F.A.S.        Again, thank you for allowing me to participate in the care of your patient.        Sincerely,        Mark Fu DPM

## 2022-06-26 ENCOUNTER — HEALTH MAINTENANCE LETTER (OUTPATIENT)
Age: 47
End: 2022-06-26
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
## 2022-09-29 ENCOUNTER — TELEPHONE (OUTPATIENT)
Dept: ORTHOPEDICS | Facility: CLINIC | Age: 47
End: 2022-09-29

## 2022-09-29 ENCOUNTER — OFFICE VISIT (OUTPATIENT)
Dept: ORTHOPEDICS | Facility: CLINIC | Age: 47
End: 2022-09-29
Payer: COMMERCIAL

## 2022-09-29 VITALS — SYSTOLIC BLOOD PRESSURE: 116 MMHG | HEART RATE: 79 BPM | DIASTOLIC BLOOD PRESSURE: 80 MMHG | OXYGEN SATURATION: 99 %

## 2022-09-29 DIAGNOSIS — S83.281A TEAR OF LATERAL MENISCUS OF RIGHT KNEE, CURRENT, UNSPECIFIED TEAR TYPE, INITIAL ENCOUNTER: ICD-10-CM

## 2022-09-29 DIAGNOSIS — M25.561 RIGHT KNEE PAIN, UNSPECIFIED CHRONICITY: Primary | ICD-10-CM

## 2022-09-29 PROCEDURE — 99203 OFFICE O/P NEW LOW 30 MIN: CPT | Performed by: ORTHOPAEDIC SURGERY

## 2022-09-29 ASSESSMENT — PAIN SCALES - GENERAL: PAINLEVEL: SEVERE PAIN (6)

## 2022-09-29 NOTE — LETTER
"    9/29/2022         RE: Darinel Alicia  [address] [address] [address] [facility] [address]        Dear Colleague,    Thank you for referring your patient, Darinel Alicia, to the [facility]. Please see a copy of my visit note below.    SUBJECTIVE:   Darinel Alicia is a 46 year old male who is seen as self referral for evaluation of right knee pain.  Stepped in a hole 3 years ago hunting injured knee. Knee would \"actup\" since then going down a hill  2 weeks ago was out hunting, and again stepped wrong and couldn't walk.    Present symptoms: pain going down hill  Pain laterally  Feels like bones coming apart laterally.  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   Swelling. Locking    Treatments tried to this point: taping the knee in the field helped him get out  Knee brace doesn't help    Previous knee issues: none prior to injury 3 years ago.     Past Medical History:   Past Medical History:   Diagnosis Date     ADHD (attention deficit hyperactivity disorder)      Spinal cord mass (H)      Past Surgical History:   Past Surgical History:   Procedure Laterality Date     CHOLECYSTECTOMY       EYE SURGERY      Multiple     GALLBLADDER SURGERY      Removal     LAMINECTOMY, EXCISE TUMOR THORACIC, COMBINED  5/16/2014    Procedure: COMBINED LAMINECTOMY, EXCISE TUMOR THORACIC;  Surgeon: Ananda Dinero MD;  Location: [facility] OR     Testicular surgery  As a child     Family History:   Family History   Problem Relation Age of Onset     Allergies Mother      Arthritis Mother      Depression Mother      Gastrointestinal Disease Mother      Obesity Mother      C.A.D. Mother      Arthritis Father      Endocrine Disease Father      Heart Disease Father      C.A.D. Father      Cancer Maternal Grandmother      Endocrine Disease Maternal Grandmother  "     C.A.D. Maternal Grandmother      Cancer Maternal Grandfather      Endocrine Disease Maternal Grandfather      C.A.D. Maternal Grandfather      Cerebrovascular Disease Paternal Grandmother      Endocrine Disease Paternal Grandmother      Heart Disease Paternal Grandmother      Obesity Paternal Grandmother      C.A.D. Paternal Grandmother      Cerebrovascular Disease Paternal Grandfather      Cancer Paternal Grandfather      Cardiovascular Paternal Grandfather      Endocrine Disease Paternal Grandfather      Heart Disease Paternal Grandfather      Obesity Paternal Grandfather      C.A.D. Paternal Grandfather      C.A.D. Brother      C.A.D. Sister      C.A.D. Brother      Social History:   Social History     Tobacco Use     Smoking status: Former Smoker     Packs/day: 0.25     Smokeless tobacco: Current User     Types: Chew   Substance Use Topics     Alcohol use: Yes     Comment: Occasional - 1 drink or less per week       Review of Systems:  Constitutional:  NEGATIVE for fever, chills, change in weight  Integumentary/Skin:  NEGATIVE for worrisome rashes, moles or lesions  Eyes:  NEGATIVE for vision changes or irritation  ENT/Mouth:  NEGATIVE for ear, mouth and throat problems  Resp:  NEGATIVE for significant cough or SOB  Breast:  NEGATIVE for masses, tenderness or discharge  CV:  NEGATIVE for chest pain, palpitations or peripheral edema  GI:  NEGATIVE for nausea, abdominal pain, heartburn, or change in bowel habits  :  Negative   Musculoskeletal:  See HPI above  Neuro:  NEGATIVE for weakness, dizziness or paresthesias  Endocrine:  NEGATIVE for temperature intolerance, skin/hair changes  Heme/allergy/immune:  NEGATIVE for bleeding problems  Psychiatric:  NEGATIVE for changes in mood or affect      OBJECTIVE:  Physical Exam:  /80 (BP Location: Left arm, Patient Position: Sitting, Cuff Size: Adult Regular)   Pulse 79   SpO2 99%   General Appearance: healthy, alert and no distress   Skin: no suspicious  lesions or rashes  Neuro: Normal strength and tone, mentation intact and speech normal  Vascular: good pulses, and cappillary refill   Lymph: no lymphadenopathy   Psych:  mentation appears normal and affect normal/bright  Resp: no increased work of breathing     Right Knee Exam:  Gait: walks with antalgic gait favoring right side  Alignment: normal   Squat: 100 % .    Patellofemoral joint: no crepitations in the patellofemoral joint.  Effusion: mild  ROM: 0-140  Tender: lateral joint line and over lcl proximal  Masses: none  Ligaments:   Lachman's: stable   Anterior/Posterior drawer: stable,   Varus/Valgus stress: stable to varus and valgus stress  McMurrays: positive laterally    X-rays:  Obtained today right knee, reviewed in the office with the patient today:  Moderate medial joint space narrowing and minimal patellofemoral joint osteoarthritis changes.       ASSESSMENT:   Possible lateral meniscus tear    PLAN:   He would like this problem taken care of ASAP  MRI ordered right knee  Follow up in the office / virtual visit/ MyChart for the results.    BECKY Vela MD  Dept. Orthopedic Surgery  [facility]       Again, thank you for allowing me to participate in the care of your patient.        Sincerely,        Mark Vela MD

## 2022-09-29 NOTE — TELEPHONE ENCOUNTER
[facility] Call Center    Phone Message    May a detailed message be left on voicemail: yes     Reason for Call: Other: patient says he was ordered to get an MRI but the orders are not finished being signed -- patient is hoping this gets done ASAP as there is an opening tmr and after that it wont be until 10/6 -- please call him as soon as orders are done      Action Taken: Message routed to:  [facility] ([facility] ortho    Travel Screening: Not Applicable

## 2022-09-29 NOTE — PROGRESS NOTES
"SUBJECTIVE:   Darinel Alicia is a 46 year old male who is seen as self referral for evaluation of right knee pain.  Stepped in a hole 3 years ago hunting injured knee. Knee would \"actup\" since then going down a hill  2 weeks ago was out hunting, and again stepped wrong and couldn't walk.    Present symptoms: pain going down hill  Pain laterally  Feels like bones coming apart laterally.  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   Swelling. Locking    Treatments tried to this point: taping the knee in the field helped him get out  Knee brace doesn't help    Previous knee issues: none prior to injury 3 years ago.     Past Medical History:   Past Medical History:   Diagnosis Date     ADHD (attention deficit hyperactivity disorder)      Spinal cord mass (H)      Past Surgical History:   Past Surgical History:   Procedure Laterality Date     CHOLECYSTECTOMY       EYE SURGERY      Multiple     GALLBLADDER SURGERY      Removal     LAMINECTOMY, EXCISE TUMOR THORACIC, COMBINED  5/16/2014    Procedure: COMBINED LAMINECTOMY, EXCISE TUMOR THORACIC;  Surgeon: Ananda Dinero MD;  Location: [facility] OR     Testicular surgery  As a child     Family History:   Family History   Problem Relation Age of Onset     Allergies Mother      Arthritis Mother      Depression Mother      Gastrointestinal Disease Mother      Obesity Mother      C.A.D. Mother      Arthritis Father      Endocrine Disease Father      Heart Disease Father      C.A.D. Father      Cancer Maternal Grandmother      Endocrine Disease Maternal Grandmother      C.A.D. Maternal Grandmother      Cancer Maternal Grandfather      Endocrine Disease Maternal Grandfather      C.A.D. Maternal Grandfather      Cerebrovascular Disease Paternal Grandmother      Endocrine Disease Paternal Grandmother      Heart Disease " Paternal Grandmother      Obesity Paternal Grandmother      C.A.DEBORAH. Paternal Grandmother      Cerebrovascular Disease Paternal Grandfather      Cancer Paternal Grandfather      Cardiovascular Paternal Grandfather      Endocrine Disease Paternal Grandfather      Heart Disease Paternal Grandfather      Obesity Paternal Grandfather      C.A.D. Paternal Grandfather      C.A.D. Brother      C.A.DEBORAH. Sister      C.A.D. Brother      Social History:   Social History     Tobacco Use     Smoking status: Former Smoker     Packs/day: 0.25     Smokeless tobacco: Current User     Types: Chew   Substance Use Topics     Alcohol use: Yes     Comment: Occasional - 1 drink or less per week       Review of Systems:  Constitutional:  NEGATIVE for fever, chills, change in weight  Integumentary/Skin:  NEGATIVE for worrisome rashes, moles or lesions  Eyes:  NEGATIVE for vision changes or irritation  ENT/Mouth:  NEGATIVE for ear, mouth and throat problems  Resp:  NEGATIVE for significant cough or SOB  Breast:  NEGATIVE for masses, tenderness or discharge  CV:  NEGATIVE for chest pain, palpitations or peripheral edema  GI:  NEGATIVE for nausea, abdominal pain, heartburn, or change in bowel habits  :  Negative   Musculoskeletal:  See HPI above  Neuro:  NEGATIVE for weakness, dizziness or paresthesias  Endocrine:  NEGATIVE for temperature intolerance, skin/hair changes  Heme/allergy/immune:  NEGATIVE for bleeding problems  Psychiatric:  NEGATIVE for changes in mood or affect      OBJECTIVE:  Physical Exam:  /80 (BP Location: Left arm, Patient Position: Sitting, Cuff Size: Adult Regular)   Pulse 79   SpO2 99%   General Appearance: healthy, alert and no distress   Skin: no suspicious lesions or rashes  Neuro: Normal strength and tone, mentation intact and speech normal  Vascular: good pulses, and cappillary refill   Lymph: no lymphadenopathy   Psych:  mentation appears normal and affect normal/bright  Resp: no increased work of breathing      Right Knee Exam:  Gait: walks with antalgic gait favoring right side  Alignment: normal   Squat: 100 % .    Patellofemoral joint: no crepitations in the patellofemoral joint.  Effusion: mild  ROM: 0-140  Tender: lateral joint line and over lcl proximal  Masses: none  Ligaments:   Lachman's: stable   Anterior/Posterior drawer: stable,   Varus/Valgus stress: stable to varus and valgus stress  McMurrays: positive laterally    X-rays:  Obtained today right knee, reviewed in the office with the patient today:  Moderate medial joint space narrowing and minimal patellofemoral joint osteoarthritis changes.       ASSESSMENT:   Possible lateral meniscus tear    PLAN:   He would like this problem taken care of ASAP  MRI ordered right knee  Follow up in the office / virtual visit/ MyChart for the results.    BECKY Vela MD  Dept. Orthopedic Surgery  [facility]

## 2022-09-29 NOTE — TELEPHONE ENCOUNTER
Advised Amrit to call imaging @ [facility] in approx 1 hr.  He can set up his MRi then. P: Danyell for results. Momo Gil OPA

## 2022-09-30 ENCOUNTER — HOSPITAL ENCOUNTER (OUTPATIENT)
Dept: MRI IMAGING | Facility: CLINIC | Age: 47
Discharge: HOME | End: 2022-09-30
Attending: ORTHOPAEDIC SURGERY | Admitting: ORTHOPAEDIC SURGERY
Payer: COMMERCIAL

## 2022-09-30 ENCOUNTER — MYC MEDICAL ADVICE (OUTPATIENT)
Dept: ORTHOPEDICS | Facility: CLINIC | Age: 47
End: 2022-09-30

## 2022-09-30 DIAGNOSIS — S83.281A TEAR OF LATERAL MENISCUS OF RIGHT KNEE, CURRENT, UNSPECIFIED TEAR TYPE, INITIAL ENCOUNTER: ICD-10-CM

## 2022-09-30 DIAGNOSIS — M25.561 RIGHT KNEE PAIN, UNSPECIFIED CHRONICITY: ICD-10-CM

## 2022-09-30 PROCEDURE — 73721 MRI JNT OF LWR EXTRE W/O DYE: CPT | Mod: 26 | Performed by: RADIOLOGY

## 2022-09-30 PROCEDURE — 73721 MRI JNT OF LWR EXTRE W/O DYE: CPT | Mod: RT

## 2022-10-04 ENCOUNTER — OFFICE VISIT (OUTPATIENT)
Dept: ORTHOPEDICS | Facility: CLINIC | Age: 47
End: 2022-10-04
Payer: COMMERCIAL

## 2022-10-04 VITALS — OXYGEN SATURATION: 97 % | HEART RATE: 76 BPM | DIASTOLIC BLOOD PRESSURE: 74 MMHG | SYSTOLIC BLOOD PRESSURE: 111 MMHG

## 2022-10-04 DIAGNOSIS — M76.31 ILIOTIBIAL BAND SYNDROME OF RIGHT SIDE: Primary | ICD-10-CM

## 2022-10-04 DIAGNOSIS — S83.241D OTHER TEAR OF MEDIAL MENISCUS OF RIGHT KNEE, UNSPECIFIED WHETHER OLD OR CURRENT TEAR, SUBSEQUENT ENCOUNTER: ICD-10-CM

## 2022-10-04 DIAGNOSIS — M79.676 PAIN OF TOE, UNSPECIFIED LATERALITY: ICD-10-CM

## 2022-10-04 PROCEDURE — 20610 DRAIN/INJ JOINT/BURSA W/O US: CPT | Mod: RT | Performed by: ORTHOPAEDIC SURGERY

## 2022-10-04 PROCEDURE — 99213 OFFICE O/P EST LOW 20 MIN: CPT | Mod: 25 | Performed by: ORTHOPAEDIC SURGERY

## 2022-10-04 RX ADMIN — LIDOCAINE HYDROCHLORIDE 4 ML: 10 INJECTION, SOLUTION EPIDURAL; INFILTRATION; INTRACAUDAL; PERINEURAL at 13:26

## 2022-10-04 RX ADMIN — METHYLPREDNISOLONE ACETATE 80 MG: 80 INJECTION, SUSPENSION INTRA-ARTICULAR; INTRALESIONAL; INTRAMUSCULAR; SOFT TISSUE at 13:26

## 2022-10-04 ASSESSMENT — PAIN SCALES - GENERAL: PAINLEVEL: MODERATE PAIN (5)

## 2022-10-04 NOTE — PROGRESS NOTES
Darinel Alicia returns for his right knee MRI results.  He is anxious to have surgery.     He reports he turns his foot in when he walks since he turned his ankle, walking on the outside of the foot. When he tries to walk normally the knee hurts more.  Needs to     Review of Systems:  Constitutional/General: Negative for fever, chills, change in weight  Integumentary/Skin: Negative for worrisome rashes, moles, or lesions  Neuro: Negative for weakness, dizziness, or paresthesias   Psychiatric: negative for changes in mood or affect    The MRI results :  9/30/22  1. Findings most consistent with iliotibial band friction syndrome and  lateral retinacular sprain with possible tear on femoral side.  2. Horizontal tear medial meniscus posterior horn.  3. Query low grade partial tear of medial head of gastrocnemius at  myotendinous junction.  4. Grade III chondromalacia patellofemoral compartment.    Exam: tender over a tight distal IT band.    Impression:   We discussed the options. It's unlikely the the medial meniscus tear is causing his symptoms on the lateral side, especially since the medial meniscus tear is a horizontal tear and likely not mechanical.  The IT band is more likely the cause, since the symptoms have been exclusively laterally    Plan:   He is anxious to have surgery if needed because of important hunts coming up.  I don't think this is needed at this time.  If more medial symptoms become an issue, then scoping the knee is an option.   I suggested IT band stretches, physical therapy including modalities ordered.  Corticosteroid injection may help short term relief.  Procedure Note:  Informed consent obtained. Risks, benefits and complications of the injection were discussed with the patient and the patient elected to proceed. A Right Knee lateral epicondyle/IT band steroid injection was performed using 1mL Depo Medrol 80mg per mL and 4mL of local anesthetic after sterile prep. This was tolerated  well by the patient.  He reported feeling much better walking out of the office.     Total time spent was 25 minutes; including but not limited to prep time and discussion.    He mentioned a problem with his great toe, and a Podiatry consult was suggested.    BECKY Vela MD  Dept. Orthopedic Surgery  [facility]

## 2022-10-04 NOTE — PATIENT INSTRUCTIONS
AFTER VISIT SUMMARY    [facility] CORTISONE Injection Discharge Instructions    You may shower, however avoid swimming, tub baths or hot tubs for 24 hours following your procedure    You may have a mild to moderate increase in pain for several days following the injection.    It may take up to 14 days for the steroid medication to start working although you may feel the effect as early as a few days after the procedure.    You may use ice packs for 10-15 minutes, 3 to 4 times a day at the injection site for comfort    You may use anti-inflammatory medications (such as Ibuprofen or Aleve or Advil) or Tylenol for pain control if necessary    If you were fasting, you may resume your normal diet and medications after the procedure    If you have diabetes, check your blood sugar more frequently than usual as your blood sugar may be higher than normal for 10-14 days following a steroid injection. Contact your doctor who manages your diabetes if your blood sugar is higher than usual      If you experience any of the following, call [facility] [phone]  -Fever over 100 degree F  -Swelling, bleeding, redness, drainage, warmth at the injection site  - New or worsening pain

## 2022-10-04 NOTE — LETTER
10/4/2022         RE: Darinel Alicia  [id number] [address] [facility] [address]        Dear Colleague,    Thank you for referring your patient, Darinel Alicia, to the [facility]. Please see a copy of my visit note below.    Darinel Alicia returns for his right knee MRI results.  He is anxious to have surgery.     He reports he turns his foot in when he walks since he turned his ankle, walking on the outside of the foot. When he tries to walk normally the knee hurts more.  Needs to     Review of Systems:  Constitutional/General: Negative for fever, chills, change in weight  Integumentary/Skin: Negative for worrisome rashes, moles, or lesions  Neuro: Negative for weakness, dizziness, or paresthesias   Psychiatric: negative for changes in mood or affect    The MRI results :  9/30/22  1. Findings most consistent with iliotibial band friction syndrome and  lateral retinacular sprain with possible tear on femoral side.  2. Horizontal tear medial meniscus posterior horn.  3. Query low grade partial tear of medial head of gastrocnemius at  myotendinous junction.  4. Grade III chondromalacia patellofemoral compartment.    Exam: tender over a tight distal IT band.    Impression:   We discussed the options. It's unlikely the the medial meniscus tear is causing his symptoms on the lateral side, especially since the medial meniscus tear is a horizontal tear and likely not mechanical.  The IT band is more likely the cause, since the symptoms have been exclusively laterally    Plan:   He is anxious to have surgery if needed because of important hunts coming up.  I don't think this is needed at this time.  If more medial symptoms become an issue, then scoping the knee is an option.   I suggested IT band stretches, physical therapy including modalities ordered.  Corticosteroid injection may help short term relief.  Procedure Note:  Informed consent obtained. Risks, benefits and  complications of the injection were discussed with the patient and the patient elected to proceed. A Right Knee lateral epicondyle/IT band steroid injection was performed using 1mL Depo Medrol 80mg per mL and 4mL of local anesthetic after sterile prep. This was tolerated well by the patient.  He reported feeling much better walking out of the office.     Total time spent was 25 minutes; including but not limited to prep time and discussion.    He mentioned a problem with his great toe, and a Podiatry consult was suggested.    BECKY Vela MD  Dept. Orthopedic Surgery  [facility]       Large Joint Injection/Arthocentesis    Date/Time: 10/4/2022 1:26 PM  Performed by: Mark Vela MD  Authorized by: Mark Vela MD     Indications:  Pain  Needle Size:  22 G  Guidance: landmark guided    Approach:  Anterolateral  Location:  Knee      Medications:  80 mg methylPREDNISolone 80 MG/ML; 4 mL lidocaine (PF) 1 %  Outcome:  Tolerated well, no immediate complications  Procedure discussed: discussed risks, benefits, and alternatives    Consent Given by:  Patient  Timeout: timeout called immediately prior to procedure    Prep: patient was prepped and draped in usual sterile fashion     RIGHT knee Iliotibial band corticosteroid injection.            Again, thank you for allowing me to participate in the care of your patient.        Sincerely,        Mark Vela MD

## 2022-10-04 NOTE — PROGRESS NOTES
Large Joint Injection/Arthocentesis    Date/Time: 10/4/2022 1:26 PM  Performed by: Mark Vela MD  Authorized by: Mark Vela MD     Indications:  Pain  Needle Size:  22 G  Guidance: landmark guided    Approach:  Anterolateral  Location:  Knee      Medications:  80 mg methylPREDNISolone 80 MG/ML; 4 mL lidocaine (PF) 1 %  Outcome:  Tolerated well, no immediate complications  Procedure discussed: discussed risks, benefits, and alternatives    Consent Given by:  Patient  Timeout: timeout called immediately prior to procedure    Prep: patient was prepped and draped in usual sterile fashion     RIGHT knee Iliotibial band corticosteroid injection.

## 2022-10-09 RX ORDER — LIDOCAINE HYDROCHLORIDE 10 MG/ML
4 INJECTION, SOLUTION EPIDURAL; INFILTRATION; INTRACAUDAL; PERINEURAL
Status: SHIPPED | OUTPATIENT
Start: 2022-10-04

## 2022-10-09 RX ORDER — METHYLPREDNISOLONE ACETATE 80 MG/ML
80 INJECTION, SUSPENSION INTRA-ARTICULAR; INTRALESIONAL; INTRAMUSCULAR; SOFT TISSUE
Status: SHIPPED | OUTPATIENT
Start: 2022-10-04

## 2022-11-21 ENCOUNTER — HEALTH MAINTENANCE LETTER (OUTPATIENT)
Age: 47
End: 2022-11-21

## 2023-05-15 ENCOUNTER — NURSE TRIAGE (OUTPATIENT)
Dept: CALL CENTER | Age: 48
End: 2023-05-15
Payer: COMMERCIAL

## 2023-05-15 NOTE — TELEPHONE ENCOUNTER
"Called patient.   Hearing is not sudden but gradual.   Pain in ear, plugged feeling and unable to \"pop\" per patient.   Has had in past. No hx of tubes    Audio appointment scheduled, from there, will determine when Dr. Acharya would like to see. Patient aware appointment is Audio only, ENT appointment to follow pending results.     Per patient's request, letter sent to patient's email with date and time of appointment.     Papo SINGH RN  [facility]     "

## 2023-05-15 NOTE — LETTER
May 15, 2023         Darinel Alicia  [address] [address] [facility] [address]            Dear Darinel:      This letter is to inform you that you have an Audiology appointment with Dr. Beck Kapoor on Thursday, May 18th, 2023 at 2:30 pm.  The appointment length will be about 1 hour, possibly a little longer, depending on results.       Thank you,   [facility]

## 2023-05-15 NOTE — TELEPHONE ENCOUNTER
.  Pt calling w/ dizziness, left ear hearing loss (gradual- over last week) and left ear pain- pain has increase 6-10 on pain scale.  Requesting appt today  See attached note  High priority note to ENT- [place], [place], [place]    ( last ENT-2-27-19, [place])  Pt requesting appt today, pt prefers above clinics,  pls contact pt for recommendation/plan.  Pt # [phone]    Pt states he does not have PCP        Reason for Disposition    Patient wants to be seen    MODERATE dizziness (e.g., interferes with normal activities) (Exception: dizziness caused by heat exposure, sudden standing, or poor fluid intake)    Additional Information    Negative: SEVERE difficulty breathing (e.g., struggling for each breath, speaks in single words)    Negative: Shock suspected (e.g., cold/pale/clammy skin, too weak to stand, low BP, rapid pulse)    Negative: Difficult to awaken or acting confused (e.g., disoriented, slurred speech)    Negative: Fainted, and still feels dizzy afterwards    Negative: Overdose (accidental or intentional) of medications    Negative: New neurologic deficit that is present now: * Weakness of the face, arm, or leg on one side of the body * Numbness of the face, arm, or leg on one side of the body * Loss of speech or garbled speech    Negative: Heart beating < 50 beats per minute OR > 140 beats per minute    Negative: Sounds like a life-threatening emergency to the triager    Negative: Chest pain    Negative: Rectal bleeding, bloody stool, or tarry-black stool    Negative: Vomiting is main symptom    Negative: Diarrhea is main symptom    Negative: Headache is main symptom    Negative: Heat exhaustion suspected (i.e., dehydration from heat exposure)    Negative: Patient states that they are having an anxiety or panic attack    Negative: Dizziness from low blood sugar (i.e., < 60 mg/dl or 3.5 mmol/l)    Negative: SEVERE dizziness (e.g., unable to stand, requires support to walk, feels like passing out  "now)    Negative: SEVERE headache or neck pain    Negative: Spinning or tilting sensation (vertigo) present now and one or more stroke risk factors (i.e., hypertension, diabetes mellitus, prior stroke/TIA, heart attack, age over 60) (Exception: prior physician evaluation for this AND no different/worse than usual)    Negative: Neurologic deficit that was brief (now gone), ANY of the following:* Weakness of the face, arm, or leg on one side of the body* Numbness of the face, arm, or leg on one side of the body* Loss of speech or garbled speech    Negative: Loss of vision or double vision  (Exception: Similar to previous migraines.)    Negative: Extra heart beats OR irregular heart beating (i.e., 'palpitations')    Negative: Difficulty breathing    Negative: Drinking very little and has signs of dehydration (e.g., no urine > 12 hours, very dry mouth, very lightheaded)    Negative: Follows bleeding (e.g., stomach, rectum, vagina)  (Exception: Became dizzy from sight of small amount blood.)    Negative: Patient sounds very sick or weak to the triager    Negative: Vomiting occurs with dizziness    Negative: Spinning or tilting sensation (vertigo) present now    Negative: Fever > 103 F (39.4 C)    Negative: Fever > 100.0 F (37.8 C) and has diabetes mellitus or a weak immune system (e.g., HIV positive, cancer chemotherapy, organ transplant, splenectomy, chronic steroids)    Negative: Lightheadedness (dizziness) present now, after 2 hours of rest and fluids    Answer Assessment - Initial Assessment Questions  1. DESCRIPTION: \"Describe your dizziness.\"        Left ear- hx of problems. Vertigo, falling down- saw someone that preformed maneuver-     Last November left ear issues started, w/ pressure and light headedness  In last few days- weeks has  increase left ear pressure, left ear pain and decreased hearing,  dizzy    2. LIGHTHEADED: \"Do you feel lightheaded?\" (e.g., somewhat faint, woozy, weak upon standing)    When " "coughing/sneeze then light headed/dizzy starts -like fainting    3. VERTIGO: \"Do you feel like either you or the room is spinning or tilting?\" (i.e. vertigo)  Feels like fainting, no vertigo    4. SEVERITY: \"How bad is it?\"  \"Do you feel like you are going to faint?\" \"Can you stand and walk?\"    - MILD: Feels slightly dizzy, but walking normally.    - MODERATE: Feels unsteady when walking, but not falling; interferes with normal activities (e.g., school, work).    - SEVERE: Unable to walk without falling, or requires assistance to walk without falling; feels like passing out now.   MODERATE    5. ONSET:  \"When did the dizziness begin?\"    Increase the last week, peaked last Saturday, along w/ ear pressure and pain-       6. AGGRAVATING FACTORS: \"Does anything make it worse?\" (e.g., standing, change in head position)     increase w/ cough sneezing  [place]    7. HEART RATE: \"Can you tell me your heart rate?\" \"How many beats in 15 seconds?\"  (Note: not all patients can do this)      no  8. CAUSE: \"What do you think is causing the dizziness?\"    Unknown-   9. RECURRENT SYMPTOM: \"Have you had dizziness before?\" If Yes, ask: \"When was the last time?\" \"What happened that time?\"    Saw ENT [place], for dizziness- but this is different,now w/  the ear pain and pressure    10. OTHER SYMPTOMS: \"Do you have any other symptoms?\" (e.g., fever, chest pain, vomiting, diarrhea, bleeding)        Left ear pressure, with increase pain- pain at a 6, but goes up to 10 at times, radiates down throat  Hearing has decreased, muffled - and has intense  ringing in ear, feels like ear drum will burst  No ear drainage  Denies nausea/vomiting    Denies any facial weakness/changes    11. PREGNANCY: \"Is there any chance you are pregnant?\" \"When was your last menstrual period?\"       NA    Protocols used: DIZZINESS-A-OH      "

## 2023-05-18 ENCOUNTER — OFFICE VISIT (OUTPATIENT)
Dept: AUDIOLOGY | Facility: CLINIC | Age: 48
End: 2023-05-18
Payer: COMMERCIAL

## 2023-05-18 DIAGNOSIS — H91.22 SUDDEN HEARING LOSS, LEFT: Primary | ICD-10-CM

## 2023-05-18 DIAGNOSIS — H93.12 LEFT-SIDED TINNITUS: ICD-10-CM

## 2023-05-18 DIAGNOSIS — H93.8X2 EAR FULLNESS, LEFT: ICD-10-CM

## 2023-05-18 DIAGNOSIS — H90.72 MIXED CONDUCTIVE AND SENSORINEURAL HEARING LOSS OF LEFT EAR WITH UNRESTRICTED HEARING OF RIGHT EAR: Primary | ICD-10-CM

## 2023-05-18 PROCEDURE — 92550 TYMPANOMETRY & REFLEX THRESH: CPT | Performed by: AUDIOLOGIST

## 2023-05-18 PROCEDURE — 92557 COMPREHENSIVE HEARING TEST: CPT | Performed by: AUDIOLOGIST

## 2023-05-18 RX ORDER — PREDNISONE 10 MG/1
TABLET ORAL
Qty: 35 TABLET | Refills: 0 | Status: SHIPPED | OUTPATIENT
Start: 2023-05-18 | End: 2023-06-16
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
## 2023-05-19 ENCOUNTER — OFFICE VISIT (OUTPATIENT)
Dept: OTOLARYNGOLOGY | Facility: CLINIC | Age: 48
End: 2023-05-19
Payer: COMMERCIAL

## 2023-05-19 VITALS — SYSTOLIC BLOOD PRESSURE: 132 MMHG | HEART RATE: 90 BPM | DIASTOLIC BLOOD PRESSURE: 89 MMHG | TEMPERATURE: 97.2 F

## 2023-05-19 DIAGNOSIS — R42 DIZZINESS: ICD-10-CM

## 2023-05-19 DIAGNOSIS — H90.5 HIGH FREQUENCY SENSORINEURAL HEARING LOSS OF LEFT EAR: ICD-10-CM

## 2023-05-19 DIAGNOSIS — R26.89 IMBALANCE: ICD-10-CM

## 2023-05-19 DIAGNOSIS — H93.8X2 EAR FULLNESS, LEFT: Primary | ICD-10-CM

## 2023-05-19 DIAGNOSIS — H90.3 ASYMMETRICAL SENSORINEURAL HEARING LOSS: ICD-10-CM

## 2023-05-19 PROCEDURE — 92504 EAR MICROSCOPY EXAMINATION: CPT | Performed by: OTOLARYNGOLOGY

## 2023-05-19 PROCEDURE — 99204 OFFICE O/P NEW MOD 45 MIN: CPT | Mod: 25 | Performed by: OTOLARYNGOLOGY

## 2023-05-19 ASSESSMENT — PAIN SCALES - GENERAL: PAINLEVEL: MODERATE PAIN (4)
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
## 2023-05-19 NOTE — NURSING NOTE
"Initial /89   Pulse 90   Temp 97.2  F (36.2  C) (Tympanic)  Estimated body mass index is 35.59 kg/m  as calculated from the following:    Height as of 5/4/22: 1.753 m (5' 9\").    Weight as of 5/4/22: 109.3 kg (241 lb). .    Glenys Garber LPN on 5/19/2023 at 2:27 PM    "

## 2023-05-19 NOTE — PROGRESS NOTES
Chief Complaint   Patient presents with     Consult     History of Present Illness   Darinel Alicia is a 47 year old male who presents to me today for ear evaluation.  The patient reports symptoms of ear fullness, pressure, decreased hearing on the left-hand side that been present for the past 6 to 7 months.  Symptoms started in November 2022, he thought that it happened around the onset of an upper respiratory illness but the symptoms did not seem to improve afterwards.  Of note, the patient is an avid right-handed firearm user.  He did have allowed discharge of a firearm next to his left ear after symptoms started in November 2022.  He denies any otalgia or otorrhea.  No bloody otorrhea.  He does have a history of positional vertigo in the past.  He also suffers from some dizziness and imbalance issues that seem to get worse with pressure changes, changes in altitude, or if he goes into open spaces like [place].  He does report a plugged feeling in the left ear and tinnitus on the left.  He feels like the ear bothers him when he swallows or when he has pressure change.  He denies hearing any eye movements in his head or hearing footsteps in his head.  He does not have a history of ear surgery. No family history of hearing loss at a young age.     The patient underwent an audiogram performed 5/18/2023.  My review of the audiogram showed normal hearing in the right ear and normal hearing to 2000 Hz sloping to mild to moderate sensorineural hearing loss in the left ear.  Pure-tone average was 4 dB on the right and 9 dB on the left.  Speech reception threshold was 10 dB on the right and 10 dB on the left.  The patient had 100% word recognition on the right and 100% word recognition on the left.  The patient had a type A tympanogram on the right and a type A tympanogram on the left.      Past Medical History  Patient Active Problem List   Diagnosis     Family history of ischemic heart disease     Thoracic back  pain     Adult ADHD     Former smoker     CARDIOVASCULAR SCREENING; LDL GOAL LESS THAN 130     Spinal cord mass (H)     Thoracic spine tumor     Current Medications     Current Outpatient Medications:      amphetamine-dextroamphetamine (ADDERALL) 10 MG tablet, Take 10 mg by mouth daily., Disp: , Rfl:      Sertraline HCl (ZOLOFT PO), Take 25 mg by mouth daily Per patient report , Disp: , Rfl:      predniSONE (DELTASONE) 10 MG tablet, Take 20mg twice daily for 5 days, then 10mg twice daily for five days, then 10mg daily for five days, then stop (Patient not taking: Reported on 5/19/2023), Disp: 35 tablet, Rfl: 0    Current Facility-Administered Medications:      lidocaine (PF) (XYLOCAINE) 1 % injection 4 mL, 4 mL, , , Mark Vlea MD, 4 mL at 10/04/22 1326     methylPREDNISolone (DEPO-MEDROL) injection 80 mg, 80 mg, , , Mark Vela MD, 80 mg at 10/04/22 1326    Allergies  Allergies   Allergen Reactions     Penicillin G      From childhood, unknown reaction       Social History   Social History     Socioeconomic History     Marital status:      Spouse name: Sruthi Alicia     Number of children: 2     Years of education: None     Highest education level: None   Tobacco Use     Smoking status: Former     Packs/day: 0.25     Types: Cigarettes     Smokeless tobacco: Current     Types: Chew   Substance and Sexual Activity     Alcohol use: Yes     Comment: Occasional - 1 drink or less per week     Drug use: No     Sexual activity: Yes     Partners: Female   Other Topics Concern     Parent/sibling w/ CABG, MI or angioplasty before 65F 55M? Yes       Family History  Family History   Problem Relation Age of Onset     Allergies Mother      Arthritis Mother      Depression Mother      Gastrointestinal Disease Mother      Obesity Mother      C.A.D. Mother      Arthritis Father      Endocrine Disease Father      Heart Disease Father      C.A.D. Father      Cancer Maternal Grandmother      Endocrine  Disease Maternal Grandmother      C.A.D. Maternal Grandmother      Cancer Maternal Grandfather      Endocrine Disease Maternal Grandfather      C.A.D. Maternal Grandfather      Cerebrovascular Disease Paternal Grandmother      Endocrine Disease Paternal Grandmother      Heart Disease Paternal Grandmother      Obesity Paternal Grandmother      C.A.D. Paternal Grandmother      Cerebrovascular Disease Paternal Grandfather      Cancer Paternal Grandfather      Cardiovascular Paternal Grandfather      Endocrine Disease Paternal Grandfather      Heart Disease Paternal Grandfather      Obesity Paternal Grandfather      C.A.D. Paternal Grandfather      C.A.D. Brother      C.A.D. Sister      C.A.D. Brother        Review of Systems  As per HPI and PMHx, otherwise 10+ comprehensive system review is negative.    Physical Exam  /89   Pulse 90   Temp 97.2  F (36.2  C) (Tympanic)   GENERAL: Patient is a pleasant, cooperative 47 year old male in no acute distress.  HEAD: Normocephalic, atraumatic.  Hair and scalp are normal.  EYES: Pupils are equal, round, reactive to light and accommodation.  Extraocular movements are intact.  The sclera nonicteric without injection.  The extraocular structures are normal.  EARS: Normal shape and symmetry.  No tenderness when palpating the mastoid or tragal areas bilaterally.  The ears were examined in the otic microscope.  Otoscopic exam on the right reveals a clear canal.  The right tympanic membrane is round, intact without evidence of effusion, good landmarks.  Otoscope exam on the left reveals a clear canal.  The left tympanic membrane is round, intact without evidence effusion, good landmarks.  I did have the patient Valsalva and his eardrum was mobile.  He had no respiratory variation of his tympanic membrane.  When the patient did Valsalva and pop his ear, he experienced some dizziness/vertigo.    NOSE: Nares are patent.  Nasal mucosa is pink and moist.  Negative anterior  rhinoscopy.  NEUROLOGIC: Cranial nerves II through XII are grossly intact.  Voice is strong.  Patient is House-Brackmann I/VI bilaterally.  CARDIOVASCULAR: Extremities are warm and well-perfused.  No significant peripheral edema.  RESPIRATORY: Patient has nonlabored breathing without cough, wheeze, stridor.  PSYCHIATRIC: Patient is alert and oriented.  Mood and affect appear normal.  SKIN: Warm and dry.  No scalp, face, or neck lesions noted.    Assessment and Plan     ICD-10-CM    1. Ear fullness, left  H93.8X2 MR Brain w/o & w Contrast     CT Temporal Bones wo Contrast     Microscopy, Binocular      2. Asymmetrical sensorineural hearing loss  H90.3 MR Brain w/o & w Contrast     CT Temporal Bones wo Contrast     Microscopy, Binocular      3. High frequency sensorineural hearing loss of left ear  H90.5 MR Brain w/o & w Contrast     CT Temporal Bones wo Contrast     Microscopy, Binocular      4. Dizziness  R42 Microscopy, Binocular      5. Imbalance  R26.89 Microscopy, Binocular        It was my pleasure seeing Darinel Alicia today in clinic.  The patient presents today with a several month history of ear fullness and pressure on the left with associated intermittent imbalance/vertigo.  He does have some asymmetric sensorineural hearing loss on his audiogram that has progressed some since his previous audiogram.  He is also an avid right-handed firearm user.  Given his history of the dizzy issues associated with the ear symptoms, I think the differential would include a vestibular schwannoma on the left, possible patulous eustachian tube on the left given his recent significant hearing loss, or even superior semicircular canal dehiscence on the left given some of his ear symptoms and associated dizziness when he pops his ear and equalizes pressure.  I think we will start with an MRI with IAC protocol and a temporal bone CT.  Depending on the appearance of his MRI and temporal bone CT, we might need to get VEMP  testing.  If work-up is unrevealing in those regards, I might have him meet with Beck Berrios at the [facility] for patulous eustachian tube evaluation.      Ciro Acharya MD  Department of Otolaryngology-Head and Neck Surgery  [facility]

## 2023-05-24 ENCOUNTER — HOSPITAL ENCOUNTER (OUTPATIENT)
Dept: MRI IMAGING | Facility: CLINIC | Age: 48
Discharge: HOME | End: 2023-05-24
Attending: OTOLARYNGOLOGY
Payer: COMMERCIAL

## 2023-05-24 ENCOUNTER — HOSPITAL ENCOUNTER (OUTPATIENT)
Dept: CT IMAGING | Facility: CLINIC | Age: 48
Discharge: HOME | End: 2023-05-24
Attending: OTOLARYNGOLOGY
Payer: COMMERCIAL

## 2023-05-24 DIAGNOSIS — H90.3 ASYMMETRICAL SENSORINEURAL HEARING LOSS: ICD-10-CM

## 2023-05-24 DIAGNOSIS — H90.5 HIGH FREQUENCY SENSORINEURAL HEARING LOSS OF LEFT EAR: ICD-10-CM

## 2023-05-24 DIAGNOSIS — H93.8X2 EAR FULLNESS, LEFT: ICD-10-CM

## 2023-05-24 PROCEDURE — 70480 CT ORBIT/EAR/FOSSA W/O DYE: CPT

## 2023-05-24 PROCEDURE — A9585 GADOBUTROL INJECTION: HCPCS | Performed by: OTOLARYNGOLOGY

## 2023-05-24 PROCEDURE — 255N000002 HC RX 255 OP 636: Performed by: OTOLARYNGOLOGY

## 2023-05-24 PROCEDURE — 70553 MRI BRAIN STEM W/O & W/DYE: CPT

## 2023-05-24 RX ORDER — GADOBUTROL 604.72 MG/ML
10.5 INJECTION INTRAVENOUS ONCE
Status: COMPLETED | OUTPATIENT
Start: 2023-05-24 | End: 2023-05-24

## 2023-05-24 RX ADMIN — GADOBUTROL 10.5 ML: 604.72 INJECTION INTRAVENOUS at 14:58

## 2023-06-13 ENCOUNTER — MYC MEDICAL ADVICE (OUTPATIENT)
Dept: OTOLARYNGOLOGY | Facility: CLINIC | Age: 48
End: 2023-06-13
Payer: COMMERCIAL

## 2023-06-13 PROCEDURE — 99358 PROLONG SERVICE W/O CONTACT: CPT | Performed by: OTOLARYNGOLOGY

## 2023-06-13 NOTE — TELEPHONE ENCOUNTER
Please see patient's message.   In for ear fullness, asymmetrical sensorineural hearing loss and dizziness. MRI neg. CT raised concern for superior canal dehiscence    He has VEMP and balance testing 6/28  Discussed possibly meeting with Dr. Berrios if work-up is unrevealing    Do you recommend any treatments prior to testing for his continued complaints of ear pressure or that he meet with Dr. Berrios regardless?     Thank you,   Papo SINGH RN  [facility]

## 2023-06-14 NOTE — TELEPHONE ENCOUNTER
A total duration of 40 minutes of non-face-to-face time spent by myself to review review and discuss CT findings, MRI findings, vestibular testing findings.  Writing and responding to multiple MyChart messages over the course of several days to discuss coordination of care and other options related to his chief complaint.  This relates to the previous visit from 5/19//2023.      Ciro Acharya MD  Department of Otolaryngology-Head and Neck Surgery  [facility]

## 2023-06-16 ENCOUNTER — OFFICE VISIT (OUTPATIENT)
Dept: OTOLARYNGOLOGY | Facility: CLINIC | Age: 48
End: 2023-06-16
Payer: COMMERCIAL

## 2023-06-16 VITALS
DIASTOLIC BLOOD PRESSURE: 78 MMHG | RESPIRATION RATE: 18 BRPM | WEIGHT: 213 LBS | BODY MASS INDEX: 31.45 KG/M2 | OXYGEN SATURATION: 97 % | HEART RATE: 77 BPM | SYSTOLIC BLOOD PRESSURE: 123 MMHG

## 2023-06-16 DIAGNOSIS — H90.5 HIGH FREQUENCY SENSORINEURAL HEARING LOSS OF LEFT EAR: ICD-10-CM

## 2023-06-16 DIAGNOSIS — J34.2 DEVIATED NASAL SEPTUM: ICD-10-CM

## 2023-06-16 DIAGNOSIS — H93.8X2 EAR FULLNESS, LEFT: Primary | ICD-10-CM

## 2023-06-16 DIAGNOSIS — J34.3 HYPERTROPHY OF BOTH INFERIOR NASAL TURBINATES: ICD-10-CM

## 2023-06-16 DIAGNOSIS — H90.3 ASYMMETRICAL SENSORINEURAL HEARING LOSS: ICD-10-CM

## 2023-06-16 DIAGNOSIS — J34.89 NASAL OBSTRUCTION: ICD-10-CM

## 2023-06-16 DIAGNOSIS — H91.22 SUDDEN HEARING LOSS, LEFT: ICD-10-CM

## 2023-06-16 PROCEDURE — 69433 CREATE EARDRUM OPENING: CPT | Mod: LT | Performed by: OTOLARYNGOLOGY

## 2023-06-16 PROCEDURE — 31575 DIAGNOSTIC LARYNGOSCOPY: CPT | Performed by: OTOLARYNGOLOGY

## 2023-06-16 PROCEDURE — 99213 OFFICE O/P EST LOW 20 MIN: CPT | Mod: 25 | Performed by: OTOLARYNGOLOGY

## 2023-06-16 NOTE — LETTER
6/16/2023         RE: Darinel Alicia  [id number] [facility] [address]        Dear Colleague,    Thank you for referring your patient, Darinel Alicia, to the [facility]. Please see a copy of my visit note below.    Chief Complaint   Patient presents with     Ent Problem     Left nostril plugged/left inner ear plugged/sx present since fall. Wanting to discus if patient has an adenoid problem or have tubes placed.     History of Present Illness  Darinel Alicia is a 47 year old male who presents today for follow-up.  I evaluated the patient for his ears on 5/19/2023.  He was having several months of symptoms that started in November 2022 after an upper respiratory illness.  Around that time, he did have a loud firearm discharge near his left ear.  He has been struggling with ear fullness, pressure, popping and crackling with his swallow.  He was having some dizziness and imbalance but found out that there was a problem with his vision prescription and after changing his vision prescription, his dizziness and imbalance has improved.  He also feels like he has issues when there are pressure changes in his ear such as changes in altitude or going into a big open space.  He does report that his left ear feels intermittently plugged and he has tinnitus on the left.  He does not have a history of ear surgery.  I was concerned about the possibility of retrocochlear pathology given his hearing asymmetry versus possible superior canal dehiscence given some of his other symptoms.  Temporal bone CT imaging and VEMP were unrevealing for superior canal dehiscence.  MRI of the inner ear and brainstem was within normal limits without signs of retrocochlear pathology.    The patient underwent an audiogram performed 5/18/2023.  My review of the audiogram showed normal hearing in the right ear and normal hearing to 2000 Hz sloping to mild to moderate sensorineural hearing loss in the  left ear.  Pure-tone average was 4 dB on the right and 9 dB on the left.  Speech reception threshold was 10 dB on the right and 10 dB on the left.  The patient had 100% word recognition on the right and 100% word recognition on the left.  The patient had a type A tympanogram on the right and a type A tympanogram on the left.      The patient contacted me with continued symptoms.  I did wonder about the possibility of patulous eustachian tube.  We briefly discussed the possibility of an ear tube in the left ear at the initial visit and discuss this more via MyChart.    The patient reports that he does have a history of gastroesophageal reflux and does have reflux almost daily.  Symptoms have been present since he had his gallbladder removed.  He denies any dysphagia, odynophagia, pharyngodynia, hemoptysis, voice change.  No neck lumps/bumps/swelling.  No unintentional weight loss.  He did have his tonsils and adenoids removed when he was a younger child.  He also reports noticing nasal obstruction and nasal congestion with nasal breathing problems alternating sides over the past several months.  No history of nose or sinus surgery.  No significant allergy history.    Past Medical History  Patient Active Problem List   Diagnosis     Family history of ischemic heart disease     Thoracic back pain     Adult ADHD     Former smoker     CARDIOVASCULAR SCREENING; LDL GOAL LESS THAN 130     Spinal cord mass (H)     Thoracic spine tumor     Current Medications    Current Outpatient Medications:      amphetamine-dextroamphetamine (ADDERALL) 10 MG tablet, Take 10 mg by mouth daily., Disp: , Rfl:      Sertraline HCl (ZOLOFT PO), Take 25 mg by mouth daily Per patient report , Disp: , Rfl:     Current Facility-Administered Medications:      lidocaine (PF) (XYLOCAINE) 1 % injection 4 mL, 4 mL, , , Mark Vela MD, 4 mL at 10/04/22 1326     methylPREDNISolone (DEPO-MEDROL) injection 80 mg, 80 mg, , , Mark Vela,  MD, 80 mg at 10/04/22 1326    Allergies  Allergies   Allergen Reactions     Penicillin G      From childhood, unknown reaction       Social History  Social History     Socioeconomic History     Marital status:      Spouse name: Sruthi Alicia     Number of children: 2     Years of education: None     Highest education level: None   Tobacco Use     Smoking status: Former     Packs/day: 0.25     Types: Cigarettes     Smokeless tobacco: Current     Types: Chew   Substance and Sexual Activity     Alcohol use: Yes     Comment: 2 beers a year per patient     Drug use: No     Sexual activity: Yes     Partners: Female   Other Topics Concern     Parent/sibling w/ CABG, MI or angioplasty before 65F 55M? Yes       Family History  Family History   Problem Relation Age of Onset     Allergies Mother      Arthritis Mother      Depression Mother      Gastrointestinal Disease Mother      Obesity Mother      C.A.D. Mother      Arthritis Father      Endocrine Disease Father      Heart Disease Father      C.A.D. Father      Cancer Maternal Grandmother      Endocrine Disease Maternal Grandmother      C.A.D. Maternal Grandmother      Cancer Maternal Grandfather      Endocrine Disease Maternal Grandfather      C.A.D. Maternal Grandfather      Cerebrovascular Disease Paternal Grandmother      Endocrine Disease Paternal Grandmother      Heart Disease Paternal Grandmother      Obesity Paternal Grandmother      C.A.D. Paternal Grandmother      Cerebrovascular Disease Paternal Grandfather      Cancer Paternal Grandfather      Cardiovascular Paternal Grandfather      Endocrine Disease Paternal Grandfather      Heart Disease Paternal Grandfather      Obesity Paternal Grandfather      C.A.D. Paternal Grandfather      C.A.D. Brother      C.A.D. Sister      C.A.D. Brother        Review of Systems  As per HPI and PMHx, otherwise 10 system review including the head and neck, constitutional, eyes, respiratory, GI, skin, neurologic, lymphatic,  endocrine, and allergy systems is negative.    Physical Exam  /78 (BP Location: Left arm, Patient Position: Sitting, Cuff Size: Adult Regular)   Pulse 77   Resp 18   Wt 96.6 kg (213 lb)   SpO2 97%   BMI 31.45 kg/m    GENERAL: Patient is a pleasant, cooperative 47 year old male in no acute distress.  HEAD: Normocephalic, atraumatic.  Hair and scalp are normal.  EYES: Pupils are equal, round, reactive to light and accommodation.  Extraocular movements are intact.  The sclera nonicteric without injection.  The extraocular structures are normal.  EARS: Normal shape and symmetry.  No tenderness when palpating the mastoid or tragal areas bilaterally.  Otoscopic exam reveals a minimal amount of cerumen bilaterally.  The bilateral tympanic membranes are round, intact without evidence of effusion, good landmarks.  No retraction, granulation, or drainage.  NOSE: Nares are patent.  Nasal mucosa is pink and moist.  The patient has a significant leftward anterior and mid nasal septal deviation with a spur that contacts the inferior turbinate posteriorly.  Patient has severe/marked inferior turbinate hypertrophy right greater than left.  No nasal cavity masses, polyps, or mucopurulence on anterior rhinoscopy.  ORAL CAVITY: Dentition is in good repair.  Mucous membranes are moist.  Tongue is mobile, protrudes to the midline.  Palate elevates symmetrically.  Tonsils are surgically absent.  No erythema or exudate.  No oral cavity or oropharyngeal masses, lesions, ulcerations, leukoplakia.  NECK: Supple, trachea is midline.  There no palpable cervical lymphadenopathy or masses bilaterally.  Palpation of the bilateral parotid and submandibular areas reveal no masses.  No thyromegaly.    NEUROLOGIC: Cranial nerves II through XII are grossly intact.  Voice is strong.  Patient is House-Brackmann I/VI bilaterally.  CARDIOVASCULAR: Extremities are warm and well-perfused.  No significant peripheral edema.  RESPIRATORY: Patient  has nonlabored breathing without cough, wheeze, stridor.  PSYCHIATRIC: Patient is alert and oriented.  Mood and affect appear normal.  SKIN: Warm and dry.  No scalp, face, or neck lesions noted.    Physical Exam and Procedure    EARS: Normal shape and symmetry.  No tenderness when palpating the mastoid or tragal areas bilaterally.  The ears were then examined under the otic binocular microscope.  Otoscopic exam on the right reveals a clear canal.  The right tympanic membrane was round, intact without evidence of effusion.  No retraction, granulation, drainage, or evidence of cholesteatoma.          Attention was turned to the left ear.  Otoscopic exam on the left reveals a clear canal.  The left tympanic membrane was round, intact without evidence of effusion.  No retraction, granulation, drainage, or evidence of cholesteatoma.          Procedure: Flexible Laryngoscopy  Indication: Ear fullness left, ear discomfort, throat symptoms    To best visualize the upper airway anatomy and due to the chief complaint and HPI, I proceeded with flexible fiberoptic laryngoscopy examination.  The bilateral nasal cavities were anesthetized and decongested with a mixture of lidocaine and neosynephrine.  The bilateral nasal cavities were examined using a flexible fiberoptic laryngoscope.  There were no nasal cavity masses, polyps, or mucopurulence bilaterally.  The nasal septum deviates significantly to the left off the maxillary crest with a spur that contacts the inferior turbinate.  The inferior turbinates are hypertrophied right greater than left.  The nasopharynx had a normal appearance with normal Eustachian tube openings and fossa of Rosenmuller bilaterally.  Minimal adenoid tissue.  Patient has severe/marked posterior oropharyngeal cobblestoning that extends down to the piriforms.  The base of tongue, vallecula, epiglottis, aryepiglottic folds, arytenoids, and piriform sinuses were without mass or lesion.  There is a  moderate amount of interarytenoid thickening and a mild amount of erythema.  The bilateral true vocal folds were symmetrically mobile without nodules or masses.  The visualized portions of the infraglottic and subglottic airway are unremarkable.  The scope was removed.  The patient tolerated the procedure well.                    Assessment and Plan     ICD-10-CM    1. Ear fullness, left  H93.8X2 LARYNGOSCOPY FLEX FIBEROPTIC, DIAGNOSTIC     Tympanotomy W/Tube     Microscopy, Binocular      2. Asymmetrical sensorineural hearing loss  H90.3 LARYNGOSCOPY FLEX FIBEROPTIC, DIAGNOSTIC     Tympanotomy W/Tube     Microscopy, Binocular      3. High frequency sensorineural hearing loss of left ear  H90.5 LARYNGOSCOPY FLEX FIBEROPTIC, DIAGNOSTIC     Tympanotomy W/Tube     Microscopy, Binocular      4. Sudden hearing loss, left  H91.22 LARYNGOSCOPY FLEX FIBEROPTIC, DIAGNOSTIC     Tympanotomy W/Tube     Microscopy, Binocular      5. Nasal obstruction  J34.89 LARYNGOSCOPY FLEX FIBEROPTIC, DIAGNOSTIC     Tympanotomy W/Tube      6. Deviated nasal septum  J34.2 LARYNGOSCOPY FLEX FIBEROPTIC, DIAGNOSTIC     Tympanotomy W/Tube      7. Hypertrophy of both inferior nasal turbinates  J34.3 LARYNGOSCOPY FLEX FIBEROPTIC, DIAGNOSTIC     Tympanotomy W/Tube         It was my pleasure seeing Darinel Alciia today in clinic.  Quite perplexed as to what exactly is driving his ear symptoms.  Some of the things he tells me makes me think that this could be possible TMJ versus glossopharyngeal neuralgia.  Some of his symptoms do sound like patulous eustachian tube.  I do think that he can try some Flonase to see if this will help his nose.  He does have a nasal septal deviation primarily to the left and is likely feeling his nasal cycle.  We could discuss possible surgery to fix his nose at some point but I do not think that there is a driving factor for his ear symptoms.    We discussed the risks, benefits, alternatives, options of left  myringotomy with tube placement including, but not limited to: Risk of bleeding, risk of infection, risk of retained tympanostomy tube, risk of tympanic membrane perforation, risk of recurrent otorrhea, tympanostomy tube failure, potential need for additional procedures including tube removal/replacement.  We discussed the postoperative course and convalescence including using eardrops.  The patient voiced understanding and is willing to proceed.  Please see the procedure note for further details.    I would like to see the patient back in 3 to 4 months for follow-up with an audiogram.    Ciro Acharya MD  Department of Otolaryngology-Head and Neck Surgery  [facility]         Again, thank you for allowing me to participate in the care of your patient.        Sincerely,        Ciro Acharya MD

## 2023-06-16 NOTE — PROCEDURES
PREOPERATIVE DIAGNOSES: Left ear fullness, ear popping, possible patulous eustachian tube, asymmetric sensorineural hearing loss on the left.    POSTOPERATIVE DIAGNOSES: Same.     PROCEDURE PERFORMED:   1. Left myringotomy with tympanostomy tube placement     SURGEON: Ciro Acharya MD      ASSISTANTS: None.     BLOOD LOSS: Scant.     COMPLICATIONS: None.      SPECIMENS: None.     ANESTHESIA: Local.     GRAFTS, IMPLANTS, DRAINS: None.     INDICATIONS: Prevent complications, treat disease.    FINDINGS:   1. Left intact tympanic membrane without middle-ear effusion.    OPERATIVE TECHNIQUE: The patient was brought to the procedure room and identified by name clinic number.  They were placed supinely on the procedure chair.  The patient was prepped and draped in standard fashion.  After standard surgical pause, the microscope was brought to the field and used throughout the remainder of the case.  I examined the ear on the left.  Cerumen was cleaned with curette.  Phenol was placed in the posterior-inferior quadrant.  A radial myringotomy was made in the posterior-inferior quadrant.  The middle ear was suctioned free.  The patient noticed improvement in hearing.  A Dura-Vent ear tube was placed into the myringotomy.       This marked the end of the procedure.  The patient tolerated the procedure well.  There were no complications.  There was minimal blood loss.  All standard protocol and universal precautions were used throughout the procedure.    Ciro Acharya MD  Department of Otolaryngology-Head and Neck Surgery  [facility]

## 2023-06-16 NOTE — NURSING NOTE
Chief Complaint   Patient presents with     Ent Problem     Left nostril plugged/left inner ear plugged/sx present since fall. Wanting to discus if patient has an adenoid problem or have tubes placed.       There were no vitals filed for this visit.  Wt Readings from Last 1 Encounters:   05/04/22 109.3 kg (241 lb)       Eliza Rivera MA
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
## 2023-06-17 ENCOUNTER — MYC MEDICAL ADVICE (OUTPATIENT)
Dept: OTOLARYNGOLOGY | Facility: CLINIC | Age: 48
End: 2023-06-17
Payer: COMMERCIAL

## 2023-06-17 DIAGNOSIS — H65.03 BILATERAL ACUTE SEROUS OTITIS MEDIA, RECURRENCE NOT SPECIFIED: ICD-10-CM

## 2023-06-17 DIAGNOSIS — H93.8X2 EAR FULLNESS, LEFT: Primary | ICD-10-CM

## 2023-06-19 RX ORDER — OFLOXACIN 3 MG/ML
SOLUTION AURICULAR (OTIC)
Qty: 5 ML | Refills: 1 | Status: SHIPPED | OUTPATIENT
Start: 2023-06-19

## 2023-06-19 NOTE — TELEPHONE ENCOUNTER
Drops sent to pharmacy  Patient has complex sx. Tubes were hopeful fix.     Papo SINGH RN  [facility]

## 2023-06-21 NOTE — TELEPHONE ENCOUNTER
Patient having continued ear plugging.   He is wondering if removal of eustachian tube would be recommended.     Please advise what next best step would be.   Papo SINGH RN  [facility]

## 2023-06-23 ENCOUNTER — OFFICE VISIT (OUTPATIENT)
Dept: OTOLARYNGOLOGY | Facility: CLINIC | Age: 48
End: 2023-06-23
Payer: COMMERCIAL

## 2023-06-23 VITALS
OXYGEN SATURATION: 100 % | HEART RATE: 60 BPM | SYSTOLIC BLOOD PRESSURE: 112 MMHG | BODY MASS INDEX: 31.1 KG/M2 | RESPIRATION RATE: 16 BRPM | DIASTOLIC BLOOD PRESSURE: 80 MMHG | WEIGHT: 210 LBS | HEIGHT: 69 IN

## 2023-06-23 DIAGNOSIS — H92.12 OTORRHEA, LEFT EAR: ICD-10-CM

## 2023-06-23 DIAGNOSIS — Z96.22 RETAINED MYRINGOTOMY TUBE IN LEFT EAR: ICD-10-CM

## 2023-06-23 DIAGNOSIS — H90.3 ASYMMETRICAL SENSORINEURAL HEARING LOSS: ICD-10-CM

## 2023-06-23 DIAGNOSIS — R42 DIZZINESS: ICD-10-CM

## 2023-06-23 DIAGNOSIS — H93.8X2 EAR FULLNESS, LEFT: Primary | ICD-10-CM

## 2023-06-23 DIAGNOSIS — H90.5 HIGH FREQUENCY SENSORINEURAL HEARING LOSS OF LEFT EAR: ICD-10-CM

## 2023-06-23 DIAGNOSIS — R26.89 IMBALANCE: ICD-10-CM

## 2023-06-23 DIAGNOSIS — H69.02 PATULOUS EUSTACHIAN TUBE, LEFT: ICD-10-CM

## 2023-06-23 DIAGNOSIS — H91.22 SUDDEN HEARING LOSS, LEFT: ICD-10-CM

## 2023-06-23 PROCEDURE — 99024 POSTOP FOLLOW-UP VISIT: CPT | Performed by: OTOLARYNGOLOGY

## 2023-06-23 PROCEDURE — 69210 REMOVE IMPACTED EAR WAX UNI: CPT | Mod: 58 | Performed by: OTOLARYNGOLOGY

## 2023-06-23 RX ORDER — CIPROFLOXACIN AND DEXAMETHASONE 3; 1 MG/ML; MG/ML
SUSPENSION/ DROPS AURICULAR (OTIC)
Qty: 10 ML | Refills: 3 | Status: SHIPPED | OUTPATIENT
Start: 2023-06-23 | End: 2023-07-10

## 2023-06-23 NOTE — PATIENT INSTRUCTIONS
Myringotomy Tube (Ear Tube) Follow Up    Ear Drainage - In the event of drainage from the ears with ear tubes in place (which is common with colds and flus) use ear drops you have on hand.  We would treat a draining ear with 5 eardrops in the draining ear twice daily for 10 days.  You do not need to be seen to start using drops or to have us send you drops.    Using Drops - To place the drops in the ear, have the child's ear up facing you.  Place the drops in the ear canal and press on the piece of cartilage in front of the ear (tragus) to help transmit the drops through the ear tube.  Do this twice daily for a total of 10 days.    Ciro Acharya MD  Department of Otolaryngology-Head and Neck Surgery  [facility]  [phone] or [phone] After hours, [facility] option

## 2023-06-23 NOTE — NURSING NOTE
"Chief Complaint   Patient presents with     Ear Tube Follow Up     Patient states feeling the incision site opening and closing,patient states feeling this sensation if he leans that head towards the ground/ear draining/needing to put a cotton ball in the ear to help with drainage. Right away in the morning patient states having yellowish pus.        Vitals:    06/23/23 1255   BP: 112/80   BP Location: Right arm   Patient Position: Sitting   Cuff Size: Adult Regular   Pulse: 60   Resp: 16   SpO2: 100%   Weight: 95.3 kg (210 lb)   Height: 1.753 m (5' 9\")     Wt Readings from Last 1 Encounters:   06/23/23 95.3 kg (210 lb)   Eliza Rivera MA    "
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
## 2023-07-05 ENCOUNTER — MYC MEDICAL ADVICE (OUTPATIENT)
Dept: OTOLARYNGOLOGY | Facility: CLINIC | Age: 48
End: 2023-07-05
Payer: COMMERCIAL

## 2023-07-05 DIAGNOSIS — H93.8X2 EAR FULLNESS, LEFT: ICD-10-CM

## 2023-07-05 DIAGNOSIS — Z96.22 RETAINED MYRINGOTOMY TUBE IN LEFT EAR: ICD-10-CM

## 2023-07-05 DIAGNOSIS — H69.02 PATULOUS EUSTACHIAN TUBE, LEFT: ICD-10-CM

## 2023-07-05 DIAGNOSIS — H90.3 ASYMMETRICAL SENSORINEURAL HEARING LOSS: ICD-10-CM

## 2023-07-05 DIAGNOSIS — H92.12 OTORRHEA, LEFT EAR: ICD-10-CM

## 2023-07-05 DIAGNOSIS — H90.5 HIGH FREQUENCY SENSORINEURAL HEARING LOSS OF LEFT EAR: ICD-10-CM

## 2023-07-05 DIAGNOSIS — R42 DIZZINESS: ICD-10-CM

## 2023-07-05 DIAGNOSIS — R26.89 IMBALANCE: ICD-10-CM

## 2023-07-05 DIAGNOSIS — H91.22 SUDDEN HEARING LOSS, LEFT: ICD-10-CM

## 2023-07-05 NOTE — TELEPHONE ENCOUNTER
Patient Rx'd Ciprodex drops on 6/23/23. Last OV note mentioned that if he isn't improving, may need a culture.    Please advise for sx's mentioned in mychart message. Also picture attached of drainage.    Sheyla Rodriguez RN on 7/5/2023 at 10:14 AM

## 2023-07-06 ENCOUNTER — OFFICE VISIT (OUTPATIENT)
Dept: OTOLARYNGOLOGY | Facility: CLINIC | Age: 48
End: 2023-07-06
Payer: COMMERCIAL

## 2023-07-06 VITALS
DIASTOLIC BLOOD PRESSURE: 79 MMHG | OXYGEN SATURATION: 99 % | SYSTOLIC BLOOD PRESSURE: 114 MMHG | HEART RATE: 86 BPM | TEMPERATURE: 97.7 F

## 2023-07-06 DIAGNOSIS — H90.3 ASYMMETRICAL SENSORINEURAL HEARING LOSS: ICD-10-CM

## 2023-07-06 DIAGNOSIS — F17.200 TOBACCO DEPENDENCE SYNDROME: ICD-10-CM

## 2023-07-06 DIAGNOSIS — Z96.22 RETAINED MYRINGOTOMY TUBE IN LEFT EAR: ICD-10-CM

## 2023-07-06 DIAGNOSIS — H91.22 SUDDEN HEARING LOSS, LEFT: ICD-10-CM

## 2023-07-06 DIAGNOSIS — H93.8X2 EAR FULLNESS, LEFT: Primary | ICD-10-CM

## 2023-07-06 DIAGNOSIS — H90.5 HIGH FREQUENCY SENSORINEURAL HEARING LOSS OF LEFT EAR: ICD-10-CM

## 2023-07-06 DIAGNOSIS — H69.02 PATULOUS EUSTACHIAN TUBE, LEFT: ICD-10-CM

## 2023-07-06 DIAGNOSIS — H92.12 OTORRHEA, LEFT EAR: ICD-10-CM

## 2023-07-06 DIAGNOSIS — Z71.6 ENCOUNTER FOR TOBACCO USE CESSATION COUNSELING: ICD-10-CM

## 2023-07-06 PROCEDURE — 69620 MYRINGOPLASTY: CPT | Mod: LT | Performed by: OTOLARYNGOLOGY

## 2023-07-06 PROCEDURE — 99214 OFFICE O/P EST MOD 30 MIN: CPT | Mod: 25 | Performed by: OTOLARYNGOLOGY

## 2023-07-06 PROCEDURE — 87070 CULTURE OTHR SPECIMN AEROBIC: CPT | Performed by: OTOLARYNGOLOGY

## 2023-07-06 PROCEDURE — 87205 SMEAR GRAM STAIN: CPT | Performed by: OTOLARYNGOLOGY

## 2023-07-06 ASSESSMENT — PAIN SCALES - GENERAL: PAINLEVEL: NO PAIN (0)

## 2023-07-06 NOTE — PROCEDURES
PREOPERATIVE DIAGNOSES: Chronic tube otorrhea on the left, history of sudden hearing loss in the left, patulous eustachian tube on the left, retained ventilation tube on the left.     POSTOPERATIVE DIAGNOSES: Same.     PROCEDURE PERFORMED:   1. Left ventilation tube removal with patch myringoplasty    SURGEON: Ciro Acharya MD      ASSISTANTS: None.     BLOOD LOSS: Scant.     COMPLICATIONS: None.      SPECIMENS: None.     ANESTHESIA: Local.     GRAFTS, IMPLANTS, DRAINS: None.     INDICATIONS: Prevent complications, treat disease.    FINDINGS:   1. Left retained ventilation tube in the posterior-inferior quadrant with mucoserous middle ear effusion.      OPERATIVE TECHNIQUE: The patient was brought to the procedure room and identified by name clinic number.  They were placed supinely on the procedure chair.  The patient was prepped and draped in standard fashion.  After standard surgical pause, the microscope was brought to the field and used throughout the remainder of the case.  I examined the ear on the right.    There was a retained Dura-Vent ear tube in the posterior-inferior quadrant.  There was drainage from the middle ear space.  The middle ear was suctioned free.  The ventilation tube was grasped and removed.  I did send the ear tube and swab with some of the ear drainage for culture.  The eardrum was anesthetized with 4% lidocaine on a cottonball.  A straight pick and cup forceps were used to remove a 1 mm rim of tympanic membrane around the perforation.  A piece of clean cigarette paper cut slightly larger than the perforation was placed overlying the perforation to assist with closure/healing.    This marked the end of the procedure.  The patient tolerated the procedure well.  There were no complications.  There was minimal blood loss.  All standard protocol and universal precautions were used throughout the procedure.    Ciro Acharya MD  Department of Otolaryngology-Head and Neck Surgery  [facility]  [place]
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
## 2023-07-06 NOTE — NURSING NOTE
"Initial /79 (BP Location: Left arm, Patient Position: Chair, Cuff Size: Adult Large)   Pulse 86   Temp 97.7  F (36.5  C) (Tympanic)   SpO2 99%  Estimated body mass index is 31.01 kg/m  as calculated from the following:    Height as of 6/23/23: 1.753 m (5' 9\").    Weight as of 6/23/23: 95.3 kg (210 lb). .    Servando Aguirre on 7/6/2023 at 9:03 AM    "

## 2023-07-07 NOTE — TELEPHONE ENCOUNTER
Patient was prescribed ofloxacin on 6/19, then ciprodex on 6/23.    Please see mychart update regarding continued ear drainage and sx's.     He is out of ear drops. Please advise if a new Rx is needed? Any other advice?    Sheyla Rodriguez RN on 7/7/2023 at 3:52 PM

## 2023-07-08 LAB
BACTERIA SPEC CULT: NO GROWTH
GRAM STAIN RESULT: NORMAL
GRAM STAIN RESULT: NORMAL

## 2023-07-09 ENCOUNTER — HEALTH MAINTENANCE LETTER (OUTPATIENT)
Age: 48
End: 2023-07-09

## 2023-07-10 RX ORDER — CLINDAMYCIN HCL 300 MG
300 CAPSULE ORAL 3 TIMES DAILY
Qty: 30 CAPSULE | Refills: 0 | Status: SHIPPED | OUTPATIENT
Start: 2023-07-10 | End: 2023-07-20

## 2023-07-10 RX ORDER — CIPROFLOXACIN AND DEXAMETHASONE 3; 1 MG/ML; MG/ML
SUSPENSION/ DROPS AURICULAR (OTIC)
Qty: 10 ML | Refills: 3 | Status: SHIPPED | OUTPATIENT
Start: 2023-07-10

## 2023-07-26 NOTE — TELEPHONE ENCOUNTER
Ciro Acharya MD  Fl Karla Foreman Rn Pool18 hours ago (3:53 PM)     ALEA Perez put him on ear drops and oral clindamycin. If he is still having issues, we need to see him again.    IJL

## 2023-07-26 NOTE — TELEPHONE ENCOUNTER
Sent patient a [employer] message asking for an update.    Sheyla Rodriguez RN on 7/26/2023 at 10:06 AM

## 2023-07-27 NOTE — TELEPHONE ENCOUNTER
Last Read in [employer]  7/26/2023 11:43 PM by Darinel Alicia     Patient read [employer] message. No response so far. Closing note. Patient knows to reach out if he is having issues still.    Sheyla Rodriguez RN on 7/27/2023 at 10:21 AM

## 2023-08-10 ENCOUNTER — OFFICE VISIT (OUTPATIENT)
Dept: ORTHOPEDICS | Facility: CLINIC | Age: 48
End: 2023-08-10
Payer: COMMERCIAL

## 2023-08-10 VITALS
BODY MASS INDEX: 31.1 KG/M2 | HEART RATE: 83 BPM | DIASTOLIC BLOOD PRESSURE: 75 MMHG | OXYGEN SATURATION: 98 % | WEIGHT: 210 LBS | SYSTOLIC BLOOD PRESSURE: 110 MMHG | HEIGHT: 69 IN

## 2023-08-10 DIAGNOSIS — M76.31 ILIOTIBIAL BAND SYNDROME OF RIGHT SIDE: Primary | ICD-10-CM

## 2023-08-10 PROCEDURE — 20610 DRAIN/INJ JOINT/BURSA W/O US: CPT | Mod: RT | Performed by: ORTHOPAEDIC SURGERY

## 2023-08-10 PROCEDURE — 99213 OFFICE O/P EST LOW 20 MIN: CPT | Mod: 25 | Performed by: ORTHOPAEDIC SURGERY

## 2023-08-10 RX ADMIN — LIDOCAINE HYDROCHLORIDE 4 ML: 10 INJECTION, SOLUTION EPIDURAL; INFILTRATION; INTRACAUDAL; PERINEURAL at 14:01

## 2023-08-10 RX ADMIN — METHYLPREDNISOLONE ACETATE 80 MG: 80 INJECTION, SUSPENSION INTRA-ARTICULAR; INTRALESIONAL; INTRAMUSCULAR; SOFT TISSUE at 14:01

## 2023-08-10 NOTE — PROGRESS NOTES
"SUBJECTIVE:   Germánmikki HANLEY Jacquesalex is here in follow up of IT band syndrome.     Had IT band corticosteroid injection 10/4/22 which helped a lot   He has hunting season coming up and wants anther shot.      Present symptoms: pain ramping up again, wants injection to get through [place]     Review of Systems:  Constitutional/General: Negative for fever, chills, change in weight  Integumentary/Skin: Negative for worrisome rashes, moles, or lesions  Neuro: Negative for weakness, dizziness, or paresthesias   Psychiatric: negative for changes in mood or affect    OBJECTIVE:  Physical Exam:  /75 (BP Location: Left arm, Patient Position: Sitting, Cuff Size: Adult Regular)   Pulse 83   Ht 1.753 m (5' 9\")   Wt 95.3 kg (210 lb)   SpO2 98%   BMI 31.01 kg/m    General Appearance: healthy, alert and no distress   Skin: no suspicious lesions or rashes  Neuro: Normal strength and tone, mentation intact and speech normal  Vascular: good pulses, and capillary refill   Lymph: no lymphadenopathy   Psych:  mentation appears normal and affect normal/bright  Resp: no increased work of breathing    Right Knee Exam:  Gait: walks with normal gait  Alignment: normal     Patellofemoral joint: mild crepitations in the patellofemoral joint.    ROM: full  Tender: at lateral epicondyle  Masses: none  Ligaments:   Lachman's: stable   Anterior/Posterior drawer: stable,   Varus/Valgus stress: stable to varus and valgus stress      The MRI results :  9/30/22  1. Findings most consistent with iliotibial band friction syndrome and  lateral retinacular sprain with possible tear on femoral side.  2. Horizontal tear medial meniscus posterior horn.  3. Query low grade partial tear of medial head of gastrocnemius at  myotendinous junction.  4. Grade III chondromalacia patellofemoral compartment.     ASSESSMENT:   IT band syndrome likely to be the cause of his pain  He had an IT injection previously which helped a lot, so " that could be diagnostic    PLAN:   Repeat the corticosteroid injection today  Procedure Note:  Informed consent obtained. Risks, benefits and complications of the injection were discussed with the patient and the patient elected to proceed. A Right Knee lateral epicondyle steroid injection was performed using 1mL Depo Medrol 80mg per mL and 4mL of local anesthetic after sterile prep. This was tolerated well by the patient.   Continue home exercise program, stretching    Return to clinic: as needed     BECKY Vela MD  Dept. Orthopedic Surgery  [facility]

## 2023-08-10 NOTE — PROGRESS NOTES
Large Joint Injection/Arthocentesis    Date/Time: 8/10/2023 2:01 PM    Performed by: Mark Vela MD  Authorized by: Mark Vela MD    Indications:  Pain  Needle Size:  22 G  Guidance: landmark guided    Approach:  Lateral  Location:  Knee      Medications:  80 mg methylPREDNISolone 80 MG/ML; 4 mL lidocaine (PF) 1 %  Outcome:  Tolerated well, no immediate complications  Procedure discussed: discussed risks, benefits, and alternatives    Consent Given by:  Patient  Timeout: timeout called immediately prior to procedure    Prep: patient was prepped and draped in usual sterile fashion      R IT band corticosteroid injection

## 2023-08-10 NOTE — PATIENT INSTRUCTIONS
AFTER VISIT SUMMARY    [facility] CORTISONE Injection Discharge Instructions    You may shower, however avoid swimming, tub baths or hot tubs for 24 hours following your procedure    You may have a mild to moderate increase in pain for several days following the injection.    It may take up to 14 days for the steroid medication to start working although you may feel the effect as early as a few days after the procedure.    You may use ice packs for 10-15 minutes, 3 to 4 times a day at the injection site for comfort    You may use anti-inflammatory medications (such as Ibuprofen or Aleve or Advil) or Tylenol for pain control if necessary    If you were fasting, you may resume your normal diet and medications after the procedure    If you have diabetes, check your blood sugar more frequently than usual as your blood sugar may be higher than normal for 10-14 days following a steroid injection. Contact your doctor who manages your diabetes if your blood sugar is higher than usual      If you experience any of the following, call [facility] [phone]  -Fever over 100 degree F  -Swelling, bleeding, redness, drainage, warmth at the injection site  - New or worsening pain

## 2023-08-10 NOTE — LETTER
"    8/10/2023         RE: Darinel Alicia  [address] [facility] [id number]        Dear Colleague,    Thank you for referring your patient, Darinel Alicia, to the [facility]. Please see a copy of my visit note below.    SUBJECTIVE:   Darinel Alicia is here in follow up of IT band syndrome.     Had IT band corticosteroid injection 10/4/22 which helped a lot   He has hunting season coming up and wants anther shot.      Present symptoms: pain ramping up again, wants injection to get through his [place]     Review of Systems:  Constitutional/General: Negative for fever, chills, change in weight  Integumentary/Skin: Negative for worrisome rashes, moles, or lesions  Neuro: Negative for weakness, dizziness, or paresthesias   Psychiatric: negative for changes in mood or affect    OBJECTIVE:  Physical Exam:  /75 (BP Location: Left arm, Patient Position: Sitting, Cuff Size: Adult Regular)   Pulse 83   Ht 1.753 m (5' 9\")   Wt 95.3 kg (210 lb)   SpO2 98%   BMI 31.01 kg/m    General Appearance: healthy, alert and no distress   Skin: no suspicious lesions or rashes  Neuro: Normal strength and tone, mentation intact and speech normal  Vascular: good pulses, and capillary refill   Lymph: no lymphadenopathy   Psych:  mentation appears normal and affect normal/bright  Resp: no increased work of breathing    Right Knee Exam:  Gait: walks with normal gait  Alignment: normal     Patellofemoral joint: mild crepitations in the patellofemoral joint.    ROM: full  Tender: at lateral epicondyle  Masses: none  Ligaments:   Lachman's: stable   Anterior/Posterior drawer: stable,   Varus/Valgus stress: stable to varus and valgus stress      The MRI results :  9/30/22  1. Findings most consistent with iliotibial band friction syndrome and  lateral retinacular sprain with possible tear on femoral side.  2. Horizontal tear medial meniscus posterior horn.  3. Query low " grade partial tear of medial head of gastrocnemius at  myotendinous junction.  4. Grade III chondromalacia patellofemoral compartment.     ASSESSMENT:   IT band syndrome likely to be the cause of his pain  He had an IT injection previously which helped a lot, so that could be diagnostic    PLAN:   Repeat the corticosteroid injection today  Procedure Note:  Informed consent obtained. Risks, benefits and complications of the injection were discussed with the patient and the patient elected to proceed. A Right Knee lateral epicondyle steroid injection was performed using 1mL Depo Medrol 80mg per mL and 4mL of local anesthetic after sterile prep. This was tolerated well by the patient.   Continue home exercise program, stretching    Return to clinic: as needed     BECKY Vela MD  Dept. Orthopedic Surgery  [facility]          Large Joint Injection/Arthocentesis    Date/Time: 8/10/2023 2:01 PM    Performed by: Mark Vela MD  Authorized by: Mark Vela MD    Indications:  Pain  Needle Size:  22 G  Guidance: landmark guided    Approach:  Lateral  Location:  Knee      Medications:  80 mg methylPREDNISolone 80 MG/ML; 4 mL lidocaine (PF) 1 %  Outcome:  Tolerated well, no immediate complications  Procedure discussed: discussed risks, benefits, and alternatives    Consent Given by:  Patient  Timeout: timeout called immediately prior to procedure    Prep: patient was prepped and draped in usual sterile fashion      R IT band corticosteroid injection      Again, thank you for allowing me to participate in the care of your patient.        Sincerely,        Mark Vela MD

## 2023-08-11 RX ORDER — METHYLPREDNISOLONE ACETATE 80 MG/ML
80 INJECTION, SUSPENSION INTRA-ARTICULAR; INTRALESIONAL; INTRAMUSCULAR; SOFT TISSUE
Status: SHIPPED | OUTPATIENT
Start: 2023-08-10

## 2023-08-11 RX ORDER — LIDOCAINE HYDROCHLORIDE 10 MG/ML
4 INJECTION, SOLUTION EPIDURAL; INFILTRATION; INTRACAUDAL; PERINEURAL
Status: SHIPPED | OUTPATIENT
Start: 2023-08-10

## 2023-12-14 ENCOUNTER — OFFICE VISIT (OUTPATIENT)
Dept: FAMILY MEDICINE | Facility: CLINIC | Age: 48
End: 2023-12-14
Payer: COMMERCIAL

## 2023-12-14 VITALS
WEIGHT: 223.2 LBS | DIASTOLIC BLOOD PRESSURE: 70 MMHG | BODY MASS INDEX: 33.06 KG/M2 | OXYGEN SATURATION: 98 % | HEART RATE: 82 BPM | HEIGHT: 69 IN | RESPIRATION RATE: 16 BRPM | SYSTOLIC BLOOD PRESSURE: 118 MMHG | TEMPERATURE: 98.2 F

## 2023-12-14 DIAGNOSIS — K21.00 GASTROESOPHAGEAL REFLUX DISEASE WITH ESOPHAGITIS, UNSPECIFIED WHETHER HEMORRHAGE: ICD-10-CM

## 2023-12-14 DIAGNOSIS — R05.2 SUBACUTE COUGH: Primary | ICD-10-CM

## 2023-12-14 PROCEDURE — 99204 OFFICE O/P NEW MOD 45 MIN: CPT | Performed by: FAMILY MEDICINE

## 2023-12-14 RX ORDER — AZITHROMYCIN 250 MG/1
TABLET, FILM COATED ORAL
Qty: 6 TABLET | Refills: 0 | Status: SHIPPED | OUTPATIENT
Start: 2023-12-14 | End: 2024-01-09

## 2023-12-14 RX ORDER — PREDNISONE 20 MG/1
20 TABLET ORAL DAILY
Qty: 5 TABLET | Refills: 1 | Status: SHIPPED | OUTPATIENT
Start: 2023-12-14

## 2023-12-14 ASSESSMENT — ENCOUNTER SYMPTOMS: COUGH: 1

## 2023-12-14 ASSESSMENT — PAIN SCALES - GENERAL: PAINLEVEL: NO PAIN (1)

## 2023-12-14 NOTE — PROGRESS NOTES
ASSESSMENT / PLAN:  (R05.2) Subacute cough  (primary encounter diagnosis)  Comment: bronchitis or early pneumonia  Plan: azithromycin (ZITHROMAX) 250 MG tablet,         predniSONE (DELTASONE) 20 MG tablet        Reveiwed risks and side effects of medication  Mucinex.consider albuterol prn. Xray and follow-up needed if not improving overall in next week or to ER if worse or shortness of breath.     (K21.00) Gastroesophageal reflux disease with esophagitis, unspecified whether hemorrhage  Comment: needs help  Plan: omeprazole (PRILOSEC) 20 MG DR capsule        Prn. Avoid eating before bedtime and egd if worse. Reveiwed risks and side effects of medication        Subjective   Darinel is a 48 year old, presenting for the following health issues:  Cough past couple + weeks. Quit smoking 2 months ago 3-4 cig/day - not heavy smoking. No asthma issues in past  Some wheezing. No sterioids or mdi in past.   No sick contacts. Hunting in [place] - cold symptoms - drove.  Not out of country. Sinus on/off issues. Left ear seen ENT in past.   Some discharge. No fevers or chills. No major discharge from nares.   Some thick discharge greenish.   No chest pain or cardiac issues. No history dvt in past. Negative  covid- swab x2.   ?PCN not sure rxn.   GERD symptoms.   Cough (Negative covid test)      12/14/2023    11:55 AM   Additional Questions   Roomed by AREN Da Silva CMA       Cough    History of Present Illness       Reason for visit:  Caught for 2-3 weeks.  Now tastes like metal  Symptom onset:  1-2 weeks ago  Symptoms include:  Cough, Soar Throat, Really bad when I lay down for bed  Symptom intensity:  Moderate  Symptom progression:  Worsening  Had these symptoms before:  No  What makes it worse:  Laying down    He eats 4 or more servings of fruits and vegetables daily.He consumes 0 sweetened beverage(s) daily.He exercises with enough effort to increase his heart rate 9 or less minutes per day.  He exercises with enough effort to  "increase his heart rate 3 or less days per week.   He is taking medications regularly.         Review of Systems   Respiratory:  Positive for cough.           Objective    /70   Pulse 82   Temp 98.2  F (36.8  C) (Oral)   Resp 16   Ht 1.753 m (5' 9\")   Wt 101.2 kg (223 lb 3.2 oz)   SpO2 98%   BMI 32.96 kg/m    Body mass index is 32.96 kg/m .  Physical Exam   GENERAL: healthy, alert and no distress  EYES: Eyes grossly normal to inspection, PERRL and conjunctivae and sclerae normal  HENT: ear canals and TM's normal,and mouth without ulcers or lesions  HENT: clear discharge nares   NECK: no adenopathy, no asymmetry, masses, or scars and thyroid normal to palpation  RESP: no wheezes. Some mild basilar crackles.  CV: regular rate and rhythm, normal S1 S2, no S3 or S4, no murmur, click or rub, no peripheral edema and peripheral pulses strong  ABDOMEN: soft, nontender, no hepatosplenomegaly, no masses and bowel sounds normal  MS: no gross musculoskeletal defects noted, no edema  SKIN: no suspicious lesions or rashes  PSYCH: mentation appears normal, affect normal/bright                "

## 2024-01-08 DIAGNOSIS — K21.00 GASTROESOPHAGEAL REFLUX DISEASE WITH ESOPHAGITIS, UNSPECIFIED WHETHER HEMORRHAGE: ICD-10-CM

## 2024-01-09 ENCOUNTER — MYC MEDICAL ADVICE (OUTPATIENT)
Dept: FAMILY MEDICINE | Facility: CLINIC | Age: 49
End: 2024-01-09
Payer: COMMERCIAL

## 2024-01-09 DIAGNOSIS — R05.2 SUBACUTE COUGH: ICD-10-CM

## 2024-01-09 RX ORDER — AZITHROMYCIN 250 MG/1
TABLET, FILM COATED ORAL
Qty: 6 TABLET | Refills: 0 | Status: SHIPPED | OUTPATIENT
Start: 2024-01-09 | End: 2024-01-13

## 2024-01-09 NOTE — TELEPHONE ENCOUNTER
Routing to provider to review and advise.    Kristina Kjellberg, MSN, RN  [facility]
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
## 2024-09-01 ENCOUNTER — HEALTH MAINTENANCE LETTER (OUTPATIENT)
Age: 49
End: 2024-09-01